# Patient Record
Sex: MALE | Race: ASIAN | NOT HISPANIC OR LATINO | URBAN - METROPOLITAN AREA
[De-identification: names, ages, dates, MRNs, and addresses within clinical notes are randomized per-mention and may not be internally consistent; named-entity substitution may affect disease eponyms.]

---

## 2017-06-14 ENCOUNTER — EMERGENCY (EMERGENCY)
Facility: HOSPITAL | Age: 57
LOS: 1 days | Discharge: HOME | End: 2017-06-14
Attending: EMERGENCY MEDICINE | Admitting: EMERGENCY MEDICINE
Payer: COMMERCIAL

## 2017-06-14 VITALS
DIASTOLIC BLOOD PRESSURE: 92 MMHG | HEART RATE: 61 BPM | OXYGEN SATURATION: 98 % | TEMPERATURE: 98 F | RESPIRATION RATE: 16 BRPM | SYSTOLIC BLOOD PRESSURE: 141 MMHG

## 2017-06-14 VITALS
DIASTOLIC BLOOD PRESSURE: 96 MMHG | WEIGHT: 143.3 LBS | HEIGHT: 63 IN | TEMPERATURE: 98 F | SYSTOLIC BLOOD PRESSURE: 143 MMHG | RESPIRATION RATE: 16 BRPM | HEART RATE: 60 BPM | OXYGEN SATURATION: 97 %

## 2017-06-14 LAB
APPEARANCE UR: CLEAR — SIGNIFICANT CHANGE UP
BILIRUB UR-MCNC: NEGATIVE — SIGNIFICANT CHANGE UP
COLOR SPEC: YELLOW — SIGNIFICANT CHANGE UP
DIFF PNL FLD: NEGATIVE — SIGNIFICANT CHANGE UP
GLUCOSE UR QL: NEGATIVE MG/DL — SIGNIFICANT CHANGE UP
KETONES UR-MCNC: NEGATIVE — SIGNIFICANT CHANGE UP
LEUKOCYTE ESTERASE UR-ACNC: NEGATIVE — SIGNIFICANT CHANGE UP
NITRITE UR-MCNC: NEGATIVE — SIGNIFICANT CHANGE UP
PH UR: 7 — SIGNIFICANT CHANGE UP (ref 5–8)
PROT UR-MCNC: NEGATIVE MG/DL — SIGNIFICANT CHANGE UP
SP GR SPEC: 1.01 — SIGNIFICANT CHANGE UP (ref 1.01–1.02)
UROBILINOGEN FLD QL: NEGATIVE MG/DL — SIGNIFICANT CHANGE UP

## 2017-06-14 PROCEDURE — 81003 URINALYSIS AUTO W/O SCOPE: CPT

## 2017-06-14 PROCEDURE — 99284 EMERGENCY DEPT VISIT MOD MDM: CPT | Mod: 25

## 2017-06-14 PROCEDURE — 74176 CT ABD & PELVIS W/O CONTRAST: CPT | Mod: 26

## 2017-06-14 PROCEDURE — 74176 CT ABD & PELVIS W/O CONTRAST: CPT

## 2017-06-14 PROCEDURE — 99284 EMERGENCY DEPT VISIT MOD MDM: CPT

## 2017-06-14 NOTE — ED PROVIDER NOTE - CHPI ED SYMPTOMS NEG
no burning urination/no nausea/no diarrhea/no abdominal distension/no hematuria/no vomiting/no blood in stool/no chills/no dysuria/no fever

## 2017-06-14 NOTE — ED PROVIDER NOTE - OBJECTIVE STATEMENT
Came in complaining of intermittent left upper quadrant pain state been going on for the past 3 weeks state 2-3 years ago had sonogram and it shows some kind of cyst the same area. Patient denies n/v/d denies any other medical problem.

## 2017-06-14 NOTE — ED ADULT NURSE NOTE - OBJECTIVE STATEMENT
Patient wlaked into ER c/o pain to left side of abdomen going on for 3 weeks now.  Pain is intermittent.

## 2017-12-06 ENCOUNTER — OUTPATIENT (OUTPATIENT)
Dept: OUTPATIENT SERVICES | Facility: HOSPITAL | Age: 57
LOS: 1 days | End: 2017-12-06
Payer: COMMERCIAL

## 2017-12-06 VITALS
WEIGHT: 145.95 LBS | TEMPERATURE: 98 F | DIASTOLIC BLOOD PRESSURE: 84 MMHG | RESPIRATION RATE: 14 BRPM | HEART RATE: 52 BPM | HEIGHT: 63 IN | SYSTOLIC BLOOD PRESSURE: 132 MMHG

## 2017-12-06 DIAGNOSIS — Z98.890 OTHER SPECIFIED POSTPROCEDURAL STATES: Chronic | ICD-10-CM

## 2017-12-06 DIAGNOSIS — L72.3 SEBACEOUS CYST: ICD-10-CM

## 2017-12-06 DIAGNOSIS — Z01.812 ENCOUNTER FOR PREPROCEDURAL LABORATORY EXAMINATION: ICD-10-CM

## 2017-12-06 LAB
ALBUMIN SERPL ELPH-MCNC: 4.1 G/DL — SIGNIFICANT CHANGE UP (ref 3.3–5)
ALP SERPL-CCNC: 41 U/L — SIGNIFICANT CHANGE UP (ref 40–120)
ALT FLD-CCNC: 30 U/L — SIGNIFICANT CHANGE UP (ref 12–78)
ANION GAP SERPL CALC-SCNC: 5 MMOL/L — SIGNIFICANT CHANGE UP (ref 5–17)
APPEARANCE UR: CLEAR — SIGNIFICANT CHANGE UP
AST SERPL-CCNC: 19 U/L — SIGNIFICANT CHANGE UP (ref 15–37)
BILIRUB SERPL-MCNC: 0.8 MG/DL — SIGNIFICANT CHANGE UP (ref 0.2–1.2)
BILIRUB UR-MCNC: NEGATIVE — SIGNIFICANT CHANGE UP
BUN SERPL-MCNC: 11 MG/DL — SIGNIFICANT CHANGE UP (ref 7–23)
CALCIUM SERPL-MCNC: 8.8 MG/DL — SIGNIFICANT CHANGE UP (ref 8.5–10.1)
CHLORIDE SERPL-SCNC: 102 MMOL/L — SIGNIFICANT CHANGE UP (ref 96–108)
CO2 SERPL-SCNC: 29 MMOL/L — SIGNIFICANT CHANGE UP (ref 22–31)
COLOR SPEC: YELLOW — SIGNIFICANT CHANGE UP
CREAT SERPL-MCNC: 1.1 MG/DL — SIGNIFICANT CHANGE UP (ref 0.5–1.3)
DIFF PNL FLD: NEGATIVE — SIGNIFICANT CHANGE UP
GLUCOSE SERPL-MCNC: 135 MG/DL — HIGH (ref 70–99)
GLUCOSE UR QL: NEGATIVE — SIGNIFICANT CHANGE UP
HBA1C BLD-MCNC: 6.3 % — HIGH (ref 4–5.6)
HCT VFR BLD CALC: 46.2 % — SIGNIFICANT CHANGE UP (ref 39–50)
HGB BLD-MCNC: 15.1 G/DL — SIGNIFICANT CHANGE UP (ref 13–17)
KETONES UR-MCNC: NEGATIVE — SIGNIFICANT CHANGE UP
LEUKOCYTE ESTERASE UR-ACNC: NEGATIVE — SIGNIFICANT CHANGE UP
MCHC RBC-ENTMCNC: 31.7 PG — SIGNIFICANT CHANGE UP (ref 27–34)
MCHC RBC-ENTMCNC: 32.7 GM/DL — SIGNIFICANT CHANGE UP (ref 32–36)
MCV RBC AUTO: 97 FL — SIGNIFICANT CHANGE UP (ref 80–100)
NITRITE UR-MCNC: NEGATIVE — SIGNIFICANT CHANGE UP
PH UR: 7 — SIGNIFICANT CHANGE UP (ref 5–8)
PLATELET # BLD AUTO: 257 K/UL — SIGNIFICANT CHANGE UP (ref 150–400)
POTASSIUM SERPL-MCNC: 4.2 MMOL/L — SIGNIFICANT CHANGE UP (ref 3.5–5.3)
POTASSIUM SERPL-SCNC: 4.2 MMOL/L — SIGNIFICANT CHANGE UP (ref 3.5–5.3)
PROT SERPL-MCNC: 7.6 G/DL — SIGNIFICANT CHANGE UP (ref 6–8.3)
PROT UR-MCNC: NEGATIVE — SIGNIFICANT CHANGE UP
RBC # BLD: 4.77 M/UL — SIGNIFICANT CHANGE UP (ref 4.2–5.8)
RBC # FLD: 11.6 % — SIGNIFICANT CHANGE UP (ref 10.3–14.5)
SODIUM SERPL-SCNC: 136 MMOL/L — SIGNIFICANT CHANGE UP (ref 135–145)
SP GR SPEC: 1 — LOW (ref 1.01–1.02)
UROBILINOGEN FLD QL: NEGATIVE — SIGNIFICANT CHANGE UP
WBC # BLD: 5.1 K/UL — SIGNIFICANT CHANGE UP (ref 3.8–10.5)
WBC # FLD AUTO: 5.1 K/UL — SIGNIFICANT CHANGE UP (ref 3.8–10.5)

## 2017-12-06 PROCEDURE — 93010 ELECTROCARDIOGRAM REPORT: CPT

## 2017-12-06 PROCEDURE — 87086 URINE CULTURE/COLONY COUNT: CPT

## 2017-12-06 PROCEDURE — 83036 HEMOGLOBIN GLYCOSYLATED A1C: CPT

## 2017-12-06 PROCEDURE — 85027 COMPLETE CBC AUTOMATED: CPT

## 2017-12-06 PROCEDURE — 80053 COMPREHEN METABOLIC PANEL: CPT

## 2017-12-06 PROCEDURE — G0463: CPT

## 2017-12-06 PROCEDURE — 81003 URINALYSIS AUTO W/O SCOPE: CPT

## 2017-12-06 PROCEDURE — 93005 ELECTROCARDIOGRAM TRACING: CPT

## 2017-12-06 NOTE — H&P PST ADULT - PROBLEM SELECTOR PLAN 1
PST Labs; CBC< CMP, HgB A1C, U/A, Urine Culture, EKG - medical Clearance with Dr Badillo - pt was seen will fax PST results to PCP for review - pre-op instructions given to pt  and wife Alondra with understanding verbalized

## 2017-12-06 NOTE — H&P PST ADULT - ASSESSMENT
57 year old male with Sebaceous Cyst -Scheduled for Excision of Scrotal Mass with Dr Chip Madden on 12/15/17 -m

## 2017-12-06 NOTE — H&P PST ADULT - VISION (WITH CORRECTIVE LENSES IF THE PATIENT USUALLY WEARS THEM):
Wears Reading Glasses/Normal vision: sees adequately in most situations; can see medication labels, newsprint

## 2017-12-06 NOTE — H&P PST ADULT - FAMILY HISTORY
Father  Still living? No  Family history of stroke, Age at diagnosis: Age Unknown     Mother  Still living? No  Family history of stroke, Age at diagnosis: Age Unknown

## 2017-12-06 NOTE — H&P PST ADULT - PMH
Anxiety    Bradycardia    Hypercholesterolemia    PRASAD (obstructive sleep apnea)  On CPAP  Prediabetes    Sebaceous cyst

## 2017-12-07 LAB
CULTURE RESULTS: NO GROWTH — SIGNIFICANT CHANGE UP
SPECIMEN SOURCE: SIGNIFICANT CHANGE UP

## 2017-12-14 RX ORDER — SODIUM CHLORIDE 9 MG/ML
1000 INJECTION, SOLUTION INTRAVENOUS
Qty: 0 | Refills: 0 | Status: DISCONTINUED | OUTPATIENT
Start: 2017-12-15 | End: 2017-12-30

## 2017-12-15 ENCOUNTER — OUTPATIENT (OUTPATIENT)
Dept: OUTPATIENT SERVICES | Facility: HOSPITAL | Age: 57
LOS: 1 days | End: 2017-12-15
Payer: COMMERCIAL

## 2017-12-15 ENCOUNTER — TRANSCRIPTION ENCOUNTER (OUTPATIENT)
Age: 57
End: 2017-12-15

## 2017-12-15 ENCOUNTER — RESULT REVIEW (OUTPATIENT)
Age: 57
End: 2017-12-15

## 2017-12-15 VITALS
SYSTOLIC BLOOD PRESSURE: 121 MMHG | HEIGHT: 63 IN | TEMPERATURE: 98 F | DIASTOLIC BLOOD PRESSURE: 76 MMHG | HEART RATE: 55 BPM | RESPIRATION RATE: 14 BRPM | WEIGHT: 145.95 LBS | OXYGEN SATURATION: 97 %

## 2017-12-15 VITALS
OXYGEN SATURATION: 98 % | HEART RATE: 68 BPM | RESPIRATION RATE: 16 BRPM | TEMPERATURE: 98 F | SYSTOLIC BLOOD PRESSURE: 105 MMHG | DIASTOLIC BLOOD PRESSURE: 75 MMHG

## 2017-12-15 DIAGNOSIS — Z98.890 OTHER SPECIFIED POSTPROCEDURAL STATES: Chronic | ICD-10-CM

## 2017-12-15 DIAGNOSIS — L72.3 SEBACEOUS CYST: ICD-10-CM

## 2017-12-15 PROCEDURE — 88304 TISSUE EXAM BY PATHOLOGIST: CPT | Mod: 26

## 2017-12-15 PROCEDURE — 88304 TISSUE EXAM BY PATHOLOGIST: CPT

## 2017-12-15 PROCEDURE — 11426 EXC H-F-NK-SP B9+MARG >4 CM: CPT

## 2017-12-15 RX ORDER — HYDROMORPHONE HYDROCHLORIDE 2 MG/ML
0.5 INJECTION INTRAMUSCULAR; INTRAVENOUS; SUBCUTANEOUS
Qty: 0 | Refills: 0 | Status: DISCONTINUED | OUTPATIENT
Start: 2017-12-15 | End: 2017-12-15

## 2017-12-15 RX ORDER — OXYCODONE HYDROCHLORIDE 5 MG/1
5 TABLET ORAL EVERY 4 HOURS
Qty: 0 | Refills: 0 | Status: DISCONTINUED | OUTPATIENT
Start: 2017-12-15 | End: 2017-12-15

## 2017-12-15 RX ORDER — SODIUM CHLORIDE 9 MG/ML
1000 INJECTION, SOLUTION INTRAVENOUS
Qty: 0 | Refills: 0 | Status: DISCONTINUED | OUTPATIENT
Start: 2017-12-15 | End: 2017-12-15

## 2017-12-15 RX ORDER — ONDANSETRON 8 MG/1
4 TABLET, FILM COATED ORAL ONCE
Qty: 0 | Refills: 0 | Status: DISCONTINUED | OUTPATIENT
Start: 2017-12-15 | End: 2017-12-15

## 2017-12-15 RX ORDER — CEFAZOLIN SODIUM 1 G
2000 VIAL (EA) INJECTION ONCE
Qty: 0 | Refills: 0 | Status: COMPLETED | OUTPATIENT
Start: 2017-12-15 | End: 2017-12-15

## 2017-12-15 RX ORDER — CEPHALEXIN 500 MG
1 CAPSULE ORAL
Qty: 15 | Refills: 0 | OUTPATIENT
Start: 2017-12-15 | End: 2017-12-19

## 2017-12-15 RX ADMIN — SODIUM CHLORIDE 100 MILLILITER(S): 9 INJECTION, SOLUTION INTRAVENOUS at 10:37

## 2017-12-15 RX ADMIN — SODIUM CHLORIDE 75 MILLILITER(S): 9 INJECTION, SOLUTION INTRAVENOUS at 09:25

## 2017-12-15 NOTE — BRIEF OPERATIVE NOTE - PROCEDURE
<<-----Click on this checkbox to enter Procedure Excision of cyst of scrotum  12/15/2017  sebaceous  Active  RLUNTZ

## 2017-12-15 NOTE — ASU PATIENT PROFILE, ADULT - VISION (WITH CORRECTIVE LENSES IF THE PATIENT USUALLY WEARS THEM):
Normal vision: sees adequately in most situations; can see medication labels, newsprint/Wears Reading Glasses

## 2017-12-15 NOTE — ASU DISCHARGE PLAN (ADULT/PEDIATRIC). - MEDICATION SUMMARY - MEDICATIONS TO TAKE
I will START or STAY ON the medications listed below when I get home from the hospital:    Percocet 5/325 oral tablet  -- 1 tab(s) by mouth every 6 hours as needed for pain MDD:6  -- Caution federal law prohibits the transfer of this drug to any person other  than the person for whom it was prescribed.  May cause drowsiness.  Alcohol may intensify this effect.  Use care when operating dangerous machinery.  This prescription cannot be refilled.  This product contains acetaminophen.  Do not use  with any other product containing acetaminophen to prevent possible liver damage.  Using more of this medication than prescribed may cause serious breathing problems.    -- Indication: For as needed for pain    cephalexin 500 mg oral capsule  -- 1 cap(s) by mouth 3 times a day   -- Finish all this medication unless otherwise directed by prescriber.    -- Indication: For antibiotic    Co Q-10 100 mg oral capsule  -- 1 cap(s) by mouth once a day  -- Indication: For as prior to admission    Omega-3 oral capsule  -- 1 cap(s) by mouth once a day  -- Indication: For as prior to admission

## 2017-12-18 LAB — SURGICAL PATHOLOGY FINAL REPORT - CH: SIGNIFICANT CHANGE UP

## 2018-06-21 ENCOUNTER — EMERGENCY (EMERGENCY)
Facility: HOSPITAL | Age: 58
LOS: 1 days | Discharge: HOME | End: 2018-06-21
Attending: EMERGENCY MEDICINE
Payer: COMMERCIAL

## 2018-06-21 VITALS
WEIGHT: 145.51 LBS | HEART RATE: 56 BPM | SYSTOLIC BLOOD PRESSURE: 134 MMHG | RESPIRATION RATE: 19 BRPM | OXYGEN SATURATION: 99 % | DIASTOLIC BLOOD PRESSURE: 87 MMHG | TEMPERATURE: 98 F

## 2018-06-21 DIAGNOSIS — Z98.890 OTHER SPECIFIED POSTPROCEDURAL STATES: Chronic | ICD-10-CM

## 2018-06-21 LAB
BASOPHILS # BLD AUTO: 0.04 K/UL — SIGNIFICANT CHANGE UP (ref 0–0.2)
BASOPHILS NFR BLD AUTO: 0.5 % — SIGNIFICANT CHANGE UP (ref 0–2)
EOSINOPHIL # BLD AUTO: 0.17 K/UL — SIGNIFICANT CHANGE UP (ref 0–0.5)
EOSINOPHIL NFR BLD AUTO: 2 % — SIGNIFICANT CHANGE UP (ref 0–6)
HCT VFR BLD CALC: 39.6 % — SIGNIFICANT CHANGE UP (ref 39–50)
HGB BLD-MCNC: 13.7 G/DL — SIGNIFICANT CHANGE UP (ref 13–17)
IMM GRANULOCYTES NFR BLD AUTO: 0.2 % — SIGNIFICANT CHANGE UP (ref 0–1.5)
LYMPHOCYTES # BLD AUTO: 2.18 K/UL — SIGNIFICANT CHANGE UP (ref 1–3.3)
LYMPHOCYTES # BLD AUTO: 25 % — SIGNIFICANT CHANGE UP (ref 13–44)
MCHC RBC-ENTMCNC: 32.3 PG — SIGNIFICANT CHANGE UP (ref 27–34)
MCHC RBC-ENTMCNC: 34.6 GM/DL — SIGNIFICANT CHANGE UP (ref 32–36)
MCV RBC AUTO: 93.4 FL — SIGNIFICANT CHANGE UP (ref 80–100)
MONOCYTES # BLD AUTO: 0.95 K/UL — HIGH (ref 0–0.9)
MONOCYTES NFR BLD AUTO: 10.9 % — SIGNIFICANT CHANGE UP (ref 2–14)
NEUTROPHILS # BLD AUTO: 5.35 K/UL — SIGNIFICANT CHANGE UP (ref 1.8–7.4)
NEUTROPHILS NFR BLD AUTO: 61.4 % — SIGNIFICANT CHANGE UP (ref 43–77)
PLATELET # BLD AUTO: 249 K/UL — SIGNIFICANT CHANGE UP (ref 150–400)
RBC # BLD: 4.24 M/UL — SIGNIFICANT CHANGE UP (ref 4.2–5.8)
RBC # FLD: 12.6 % — SIGNIFICANT CHANGE UP (ref 10.3–14.5)
URATE SERPL-MCNC: 5.4 MG/DL — SIGNIFICANT CHANGE UP (ref 3.4–8.8)
WBC # BLD: 8.71 K/UL — SIGNIFICANT CHANGE UP (ref 3.8–10.5)
WBC # FLD AUTO: 8.71 K/UL — SIGNIFICANT CHANGE UP (ref 3.8–10.5)

## 2018-06-21 PROCEDURE — 73630 X-RAY EXAM OF FOOT: CPT | Mod: 26,RT

## 2018-06-21 PROCEDURE — 73630 X-RAY EXAM OF FOOT: CPT

## 2018-06-21 PROCEDURE — 85652 RBC SED RATE AUTOMATED: CPT

## 2018-06-21 PROCEDURE — 85027 COMPLETE CBC AUTOMATED: CPT

## 2018-06-21 PROCEDURE — 99283 EMERGENCY DEPT VISIT LOW MDM: CPT | Mod: 25

## 2018-06-21 PROCEDURE — 99283 EMERGENCY DEPT VISIT LOW MDM: CPT

## 2018-06-21 PROCEDURE — 84550 ASSAY OF BLOOD/URIC ACID: CPT

## 2018-06-21 RX ORDER — IBUPROFEN 200 MG
600 TABLET ORAL ONCE
Qty: 0 | Refills: 0 | Status: COMPLETED | OUTPATIENT
Start: 2018-06-21 | End: 2018-06-21

## 2018-06-21 RX ORDER — INDOMETHACIN 50 MG
50 CAPSULE ORAL ONCE
Qty: 0 | Refills: 0 | Status: DISCONTINUED | OUTPATIENT
Start: 2018-06-21 | End: 2018-06-21

## 2018-06-21 RX ORDER — UBIDECARENONE 100 MG
1 CAPSULE ORAL
Qty: 0 | Refills: 0 | COMMUNITY

## 2018-06-21 RX ORDER — OMEGA-3 ACID ETHYL ESTERS 1 G
1 CAPSULE ORAL
Qty: 0 | Refills: 0 | COMMUNITY

## 2018-06-21 RX ADMIN — Medication 600 MILLIGRAM(S): at 20:42

## 2018-06-21 NOTE — ED PROVIDER NOTE - PROGRESS NOTE DETAILS
patient resting comfortably,  refused to wait till complete blood work results,  refused rx medication, states will follow up with pmd , copy of available blood work results given, advised if any concerns return to ed

## 2018-06-21 NOTE — ED ADULT NURSE NOTE - OBJECTIVE STATEMENT
Present to ER with c/o of right big toe pain. Pt states he does not recall any trauma of injury. Denies any numbness or weakness

## 2018-06-21 NOTE — ED PROVIDER NOTE - OBJECTIVE STATEMENT
58 y male presents with right 1st metatarsal pain x 3 days, does not recall specific injury,  no fever, nonsmoker, no etoh.  PMD Dr Langley  pmh: dm, htn  PMH: Asthma, mild intermittent, uncomplicated  Blood disorder  Factor Eleven Deficiency  Gastroesophageal reflux disease, esophagitis presence not specified  Hyperlipidemia  Hypertension  Hypothyroidism  Obesity  PRASAD on CPAP    Peripheral neuropathy  Seasonal allergies  Sleep apnea

## 2018-06-21 NOTE — ED PROVIDER NOTE - ATTENDING CONTRIBUTION TO CARE
I have personally performed a face to face diagnostic evaluation on this patient.  I have reviewed the PA note and agree with the history, exam, and plan of care, except as noted.  History and Exam by me shows 58 male c/o right great toe pain for 3 days, denies trauma, no fever, patient in no acute distress, heart and lung sounds clear, abdomen soft, right great toe shows mild swelling with some blanching erythema, cap refill < 2 seconds, no open wounds, to get NSAID, xray, f/u as outpatient for further work up.

## 2018-06-22 LAB — ERYTHROCYTE [SEDIMENTATION RATE] IN BLOOD: 13 MM/HR — SIGNIFICANT CHANGE UP (ref 0–20)

## 2019-07-19 NOTE — H&P PST ADULT - HISTORY OF PRESENT ILLNESS
Family Medicine 57 year old male h/o Hypercholesterolemia, Sleep Apnea - scheduled for  Excision Scrotal Mass with Dr Chip Madden on 12/15/17 - pt notes he has had it for 7 years already - "started as a small black pimple" - pt notes he has been being monitored for few years by Dr Madden - pt notes he has had Sonogram and MRI and its "Encapsulated" - pt notes it has been getting bigger- denies any urinary symptoms - following discussions with Dr Madden pt is electing for scheduled procedure.

## 2019-11-21 ENCOUNTER — APPOINTMENT (OUTPATIENT)
Dept: OTOLARYNGOLOGY | Facility: CLINIC | Age: 59
End: 2019-11-21

## 2021-03-26 NOTE — ED ADULT TRIAGE NOTE - AS O2 DELIVERY
Tracking was updated.  No lab order necessary. Anticipated INR recheck date via [employer]: 4/23/21.  INR results will be called/faxed in.  INR: 3.0 on 3/26/21  Last office visit with Dr. Padilla on 4/29/20       room air

## 2021-11-22 NOTE — ED PROVIDER NOTE - NS HIV RISK FACTOR YES
You can access the FollowMyHealth Patient Portal offered by [facility] by registering at the following website: [email or web address]. By joining [employer]’s FollowMyHealth portal, you will also be able to view your health information using other applications (apps) compatible with our system.
Declined

## 2022-02-24 PROBLEM — G47.33 OBSTRUCTIVE SLEEP APNEA (ADULT) (PEDIATRIC): Chronic | Status: ACTIVE | Noted: 2017-12-06

## 2022-02-24 PROBLEM — E78.00 PURE HYPERCHOLESTEROLEMIA, UNSPECIFIED: Chronic | Status: ACTIVE | Noted: 2017-12-06

## 2022-02-24 PROBLEM — R73.03 PREDIABETES: Chronic | Status: ACTIVE | Noted: 2017-12-06

## 2022-02-24 PROBLEM — R00.1 BRADYCARDIA, UNSPECIFIED: Chronic | Status: ACTIVE | Noted: 2017-12-06

## 2022-02-24 PROBLEM — L72.3 SEBACEOUS CYST: Chronic | Status: ACTIVE | Noted: 2017-12-06

## 2022-02-24 PROBLEM — F41.9 ANXIETY DISORDER, UNSPECIFIED: Chronic | Status: ACTIVE | Noted: 2017-12-06

## 2022-02-25 ENCOUNTER — EMERGENCY (EMERGENCY)
Facility: HOSPITAL | Age: 62
LOS: 1 days | Discharge: HOME | End: 2022-02-25
Attending: EMERGENCY MEDICINE | Admitting: EMERGENCY MEDICINE
Payer: COMMERCIAL

## 2022-02-25 VITALS
TEMPERATURE: 98 F | HEART RATE: 65 BPM | RESPIRATION RATE: 18 BRPM | SYSTOLIC BLOOD PRESSURE: 150 MMHG | DIASTOLIC BLOOD PRESSURE: 93 MMHG | OXYGEN SATURATION: 97 %

## 2022-02-25 VITALS
OXYGEN SATURATION: 98 % | WEIGHT: 149.91 LBS | SYSTOLIC BLOOD PRESSURE: 165 MMHG | DIASTOLIC BLOOD PRESSURE: 91 MMHG | RESPIRATION RATE: 18 BRPM | TEMPERATURE: 98 F | HEIGHT: 63 IN | HEART RATE: 56 BPM

## 2022-02-25 DIAGNOSIS — Z98.890 OTHER SPECIFIED POSTPROCEDURAL STATES: Chronic | ICD-10-CM

## 2022-02-25 DIAGNOSIS — R07.9 CHEST PAIN, UNSPECIFIED: ICD-10-CM

## 2022-02-25 LAB
ALBUMIN SERPL ELPH-MCNC: 3.9 G/DL — SIGNIFICANT CHANGE UP (ref 3.3–5)
ALP SERPL-CCNC: 37 U/L — LOW (ref 40–120)
ALT FLD-CCNC: 39 U/L — SIGNIFICANT CHANGE UP (ref 12–78)
ANION GAP SERPL CALC-SCNC: 6 MMOL/L — SIGNIFICANT CHANGE UP (ref 5–17)
AST SERPL-CCNC: 25 U/L — SIGNIFICANT CHANGE UP (ref 15–37)
BASOPHILS # BLD AUTO: 0.05 K/UL — SIGNIFICANT CHANGE UP (ref 0–0.2)
BASOPHILS NFR BLD AUTO: 0.8 % — SIGNIFICANT CHANGE UP (ref 0–2)
BILIRUB SERPL-MCNC: 0.9 MG/DL — SIGNIFICANT CHANGE UP (ref 0.2–1.2)
BUN SERPL-MCNC: 8 MG/DL — SIGNIFICANT CHANGE UP (ref 7–23)
CALCIUM SERPL-MCNC: 9.1 MG/DL — SIGNIFICANT CHANGE UP (ref 8.5–10.1)
CHLORIDE SERPL-SCNC: 104 MMOL/L — SIGNIFICANT CHANGE UP (ref 96–108)
CK SERPL-CCNC: 225 U/L — SIGNIFICANT CHANGE UP (ref 26–308)
CO2 SERPL-SCNC: 26 MMOL/L — SIGNIFICANT CHANGE UP (ref 22–31)
CREAT SERPL-MCNC: 1.1 MG/DL — SIGNIFICANT CHANGE UP (ref 0.5–1.3)
D DIMER BLD IA.RAPID-MCNC: <150 NG/ML DDU — SIGNIFICANT CHANGE UP
EOSINOPHIL # BLD AUTO: 0.16 K/UL — SIGNIFICANT CHANGE UP (ref 0–0.5)
EOSINOPHIL NFR BLD AUTO: 2.4 % — SIGNIFICANT CHANGE UP (ref 0–6)
GLUCOSE SERPL-MCNC: 121 MG/DL — HIGH (ref 70–99)
HCT VFR BLD CALC: 39.6 % — SIGNIFICANT CHANGE UP (ref 39–50)
HGB BLD-MCNC: 13.9 G/DL — SIGNIFICANT CHANGE UP (ref 13–17)
IMM GRANULOCYTES NFR BLD AUTO: 0.2 % — SIGNIFICANT CHANGE UP (ref 0–1.5)
LIDOCAIN IGE QN: 143 U/L — SIGNIFICANT CHANGE UP (ref 73–393)
LYMPHOCYTES # BLD AUTO: 2.09 K/UL — SIGNIFICANT CHANGE UP (ref 1–3.3)
LYMPHOCYTES # BLD AUTO: 31.4 % — SIGNIFICANT CHANGE UP (ref 13–44)
MAGNESIUM SERPL-MCNC: 2.1 MG/DL — SIGNIFICANT CHANGE UP (ref 1.6–2.6)
MCHC RBC-ENTMCNC: 32.2 PG — SIGNIFICANT CHANGE UP (ref 27–34)
MCHC RBC-ENTMCNC: 35.1 GM/DL — SIGNIFICANT CHANGE UP (ref 32–36)
MCV RBC AUTO: 91.7 FL — SIGNIFICANT CHANGE UP (ref 80–100)
MONOCYTES # BLD AUTO: 0.61 K/UL — SIGNIFICANT CHANGE UP (ref 0–0.9)
MONOCYTES NFR BLD AUTO: 9.2 % — SIGNIFICANT CHANGE UP (ref 2–14)
NEUTROPHILS # BLD AUTO: 3.73 K/UL — SIGNIFICANT CHANGE UP (ref 1.8–7.4)
NEUTROPHILS NFR BLD AUTO: 56 % — SIGNIFICANT CHANGE UP (ref 43–77)
NRBC # BLD: 0 /100 WBCS — SIGNIFICANT CHANGE UP (ref 0–0)
PHOSPHATE SERPL-MCNC: 3 MG/DL — SIGNIFICANT CHANGE UP (ref 2.5–4.5)
PLATELET # BLD AUTO: 252 K/UL — SIGNIFICANT CHANGE UP (ref 150–400)
POTASSIUM SERPL-MCNC: 4 MMOL/L — SIGNIFICANT CHANGE UP (ref 3.5–5.3)
POTASSIUM SERPL-SCNC: 4 MMOL/L — SIGNIFICANT CHANGE UP (ref 3.5–5.3)
PROT SERPL-MCNC: 7.1 G/DL — SIGNIFICANT CHANGE UP (ref 6–8.3)
RBC # BLD: 4.32 M/UL — SIGNIFICANT CHANGE UP (ref 4.2–5.8)
RBC # FLD: 12.5 % — SIGNIFICANT CHANGE UP (ref 10.3–14.5)
SODIUM SERPL-SCNC: 136 MMOL/L — SIGNIFICANT CHANGE UP (ref 135–145)
TROPONIN I, HIGH SENSITIVITY RESULT: 10.6 NG/L — SIGNIFICANT CHANGE UP
TROPONIN I, HIGH SENSITIVITY RESULT: 13.3 NG/L — SIGNIFICANT CHANGE UP
WBC # BLD: 6.65 K/UL — SIGNIFICANT CHANGE UP (ref 3.8–10.5)
WBC # FLD AUTO: 6.65 K/UL — SIGNIFICANT CHANGE UP (ref 3.8–10.5)

## 2022-02-25 PROCEDURE — 85025 COMPLETE CBC W/AUTO DIFF WBC: CPT

## 2022-02-25 PROCEDURE — 71045 X-RAY EXAM CHEST 1 VIEW: CPT | Mod: 26

## 2022-02-25 PROCEDURE — 84100 ASSAY OF PHOSPHORUS: CPT

## 2022-02-25 PROCEDURE — 82550 ASSAY OF CK (CPK): CPT

## 2022-02-25 PROCEDURE — 99285 EMERGENCY DEPT VISIT HI MDM: CPT

## 2022-02-25 PROCEDURE — 99284 EMERGENCY DEPT VISIT MOD MDM: CPT

## 2022-02-25 PROCEDURE — 72040 X-RAY EXAM NECK SPINE 2-3 VW: CPT

## 2022-02-25 PROCEDURE — 83690 ASSAY OF LIPASE: CPT

## 2022-02-25 PROCEDURE — 72040 X-RAY EXAM NECK SPINE 2-3 VW: CPT | Mod: 26

## 2022-02-25 PROCEDURE — 71045 X-RAY EXAM CHEST 1 VIEW: CPT

## 2022-02-25 PROCEDURE — 99285 EMERGENCY DEPT VISIT HI MDM: CPT | Mod: 25

## 2022-02-25 PROCEDURE — 93010 ELECTROCARDIOGRAM REPORT: CPT

## 2022-02-25 PROCEDURE — 84484 ASSAY OF TROPONIN QUANT: CPT

## 2022-02-25 PROCEDURE — 83735 ASSAY OF MAGNESIUM: CPT

## 2022-02-25 PROCEDURE — 93005 ELECTROCARDIOGRAM TRACING: CPT

## 2022-02-25 PROCEDURE — 36415 COLL VENOUS BLD VENIPUNCTURE: CPT

## 2022-02-25 PROCEDURE — 85379 FIBRIN DEGRADATION QUANT: CPT

## 2022-02-25 PROCEDURE — 80053 COMPREHEN METABOLIC PANEL: CPT

## 2022-02-25 RX ORDER — ATORVASTATIN CALCIUM 80 MG/1
1 TABLET, FILM COATED ORAL
Qty: 0 | Refills: 0 | DISCHARGE

## 2022-02-25 RX ORDER — METFORMIN HYDROCHLORIDE 850 MG/1
1 TABLET ORAL
Qty: 0 | Refills: 0 | DISCHARGE

## 2022-02-25 RX ORDER — CYCLOBENZAPRINE HYDROCHLORIDE 10 MG/1
10 TABLET, FILM COATED ORAL ONCE
Refills: 0 | Status: COMPLETED | OUTPATIENT
Start: 2022-02-25 | End: 2022-02-25

## 2022-02-25 NOTE — ED PROVIDER NOTE - ENMT, MLM
Patient/Caregiver provided printed discharge information.
Airway patent, Nasal mucosa clear. Mouth with normal mucosa.

## 2022-02-25 NOTE — ED PROVIDER NOTE - CLINICAL SUMMARY MEDICAL DECISION MAKING FREE TEXT BOX
Pt is a 62 yo male who presents to the ED with a cc of left arm tingling. PMHx of anxiety, h/o bradycardia, HLD, PRASAD, pre-DM, h/o sebaceous cyst. Pt reports that last Thursday he received his Pfizer booster in his right arm. He reports that on Sunday he noted some tingling to his left arm. He reports that last night he also began to feel warm. Pt denies fever. Pt reports that the tingling to his left arm is intermittent. Pt denies tingling at this time but does note some discomfort to his left arm. Denies any injury to the arm, denies any heavy lifting. Pt reports that when he is experiencing the pain or tingling the symptoms radiate from his shoulder into his wrist but not hand. Denies any provoking or relieving symptoms. Pt also notes 4 days of nausea and some intermittent chest tightness. Denies V/D/C, CP, SOB, abd pain. Denies HA, visual changes. Denies neck pain. Pt took ASA at 3 am. Of note wife reports that pt had clean heart cath 2014. Pt is a 62 yo male who presents to the ED with a cc of left arm tingling. PMHx of anxiety, h/o bradycardia, HLD, PRASAD, DM, h/o sebaceous cyst. Pt reports that last Thursday he received his Pfizer booster in his right arm. He reports that on Sunday he noted some tingling to his left arm. He reports that last night he also began to feel warm. Pt denies fever. Pt reports that the tingling to his left arm is intermittent. Pt denies tingling at this time but does note some discomfort to his left arm. Denies any injury to the arm, denies any heavy lifting. Pt reports that when he is experiencing the pain or tingling the symptoms radiate from his shoulder into his wrist but not hand. Denies any provoking or relieving symptoms. Pt also notes 4 days of nausea and some intermittent chest tightness. Denies V/D/C, CP, SOB, abd pain. Denies HA, visual changes. Denies neck pain. Pt took ASA at 3 am. Of note wife reports that pt had clean heart cath 2014. Pt presenting with atypical chest pain and left arm intermittent paresthesias. For chest pain will obtain screening labs, EKG ad consult with cardiology. For arm paresthesia will obtain imaging of neck and will medicate

## 2022-02-25 NOTE — ED ADULT NURSE NOTE - OBJECTIVE STATEMENT
received patient in stretcher, alert and oriented, No signs of acute distress.  Pt reports receiving covid booster shot in right arm this past Thursday, since Sunday pt c/o tingling and pain down left arm, chest tightness, nausea and hot flashes. Pt denies any trauma to right arm.

## 2022-02-25 NOTE — ED PROVIDER NOTE - NSICDXFAMILYHX_GEN_ALL_CORE_FT
FAMILY HISTORY:  Father  Still living? No  Family history of stroke, Age at diagnosis: Age Unknown    Mother  Still living? No  Family history of stroke, Age at diagnosis: Age Unknown

## 2022-02-25 NOTE — ED PROVIDER NOTE - PATIENT PORTAL LINK FT
You can access the FollowMyHealth Patient Portal offered by [facility] by registering at the following website: [email or web address]. By joining [employer]’s FollowMyHealth portal, you will also be able to view your health information using other applications (apps) compatible with our system.

## 2022-02-25 NOTE — ED ADULT TRIAGE NOTE - CHIEF COMPLAINT QUOTE
Patient arrives alert and oriented x4 c/o left arm tingling and states he feels. Patient reports that he recently got booster shot last Thursday and states symptom started after, accompanied with nausea, increased tingling to left arm and last night at 12am felt hot and could not go back to sleep. Took ASA 325mg today. Patient respiration even and unlabored + RO, neuro intact, clear speech. Yaakovhukwelvin ALVAREZ

## 2022-02-25 NOTE — ED PROVIDER NOTE - PHYSICAL EXAMINATION
no midline C/T/L TTP  no TTP overlying left bicep tendon  No reproducible TTP to LUE, sensation grossly intact 5/5 strength, +radial pulse cap refill less then 2 seconds

## 2022-02-25 NOTE — ED PROVIDER NOTE - NSICDXPASTMEDICALHX_GEN_ALL_CORE_FT
PAST MEDICAL HISTORY:  Anxiety     Bradycardia     Hypercholesterolemia     PRASAD (obstructive sleep apnea) On CPAP    Prediabetes     Sebaceous cyst

## 2022-02-25 NOTE — ED PROVIDER NOTE - OBJECTIVE STATEMENT
Pt is a 60 yo male who presents to the ED with a cc of left arm tingling. PMHx of anxiety, h/o bradycardia, HLD, PRASAD, pre-DM, h/o sebaceous cyst. Pt reports that last thursday he receieved his Phizer booster in his right arm. He reports that on Sunday he noted some tingling to his left arm. He reports that last night he also began to feel warm. Pt denies fever. Pt reports that the tingling to his left arm is intermittent. Pt denies tingling at this time but does note some discomfort to his left arm. Denies any injuyr to the arm, denies any heavy lifiting. Pt erports that when he is experiencing the pain or tingling the symptoms radiate from his shoulder into his wrist but not hand. Denies any provoking or relieving symptoms. Pt also notes 4 days of nausea. Denies V/D/C, CP, SOB, abd pain. Denies HA, visual changes. Denies neck pain. Pt took ASA at 3 am.     PMD: Cahrissa  Cardio: none Pt is a 60 yo male who presents to the ED with a cc of left arm tingling. PMHx of anxiety, h/o bradycardia, HLD, PRASAD, pre-DM, h/o sebaceous cyst. Pt reports that last Thursday he received his Pfizer booster in his right arm. He reports that on Sunday he noted some tingling to his left arm. He reports that last night he also began to feel warm. Pt denies fever. Pt reports that the tingling to his left arm is intermittent. Pt denies tingling at this time but does note some discomfort to his left arm. Denies any injury to the arm, denies any heavy lifting. Pt reports that when he is experiencing the pain or tingling the symptoms radiate from his shoulder into his wrist but not hand. Denies any provoking or relieving symptoms. Pt also notes 4 days of nausea and some intermittent chest tightness. Denies V/D/C, CP, SOB, abd pain. Denies HA, visual changes. Denies neck pain. Pt took ASA at 3 am. Of note wife reports that pt had clean heart cath 2014.    PMD: Charissa  Cardio: none Pt is a 60 yo male who presents to the ED with a cc of left arm tingling. PMHx of anxiety, h/o bradycardia, HLD, PRASAD, DM, h/o sebaceous cyst. Pt reports that last Thursday he received his Pfizer booster in his right arm. He reports that on Sunday he noted some tingling to his left arm. He reports that last night he also began to feel warm. Pt denies fever. Pt reports that the tingling to his left arm is intermittent. Pt denies tingling at this time but does note some discomfort to his left arm. Denies any injury to the arm, denies any heavy lifting. Pt reports that when he is experiencing the pain or tingling the symptoms radiate from his shoulder into his wrist but not hand. Denies any provoking or relieving symptoms. Pt also notes 4 days of nausea and some intermittent chest tightness. Denies V/D/C, CP, SOB, abd pain. Denies HA, visual changes. Denies neck pain. Pt took ASA at 3 am. Of note wife reports that pt had clean heart cath 2014.    PMD: Charissa  Cardio: none

## 2022-02-25 NOTE — CONSULT NOTE ADULT - ASSESSMENT
Mr. Cross is a 61 year old male with HTN, DM2 and HLD, here with left arm pain and heaviness, and intermittent chest tightness.    - pain is atypical and no evidence of acs  - ekg is a sr without worrisome findings to suggest ischemia  - troponin is negative, and will repeat in 2-3 hours  - history of LAD lesion on CT, with only mild non obstructive CAD on cath in 2014.  - start as 81 mg po daily  - cont ace inhibitor and statin  - if 2nd troponin is negative, no issue with dc home. He will follow up in the office for an echo and nuclear stress test.

## 2022-02-25 NOTE — ED PROVIDER NOTE - CARE PROVIDER_API CALL
Justin Fitzpatrick)  Cardiovascular Disease; Internal Medicine  [address], [place] [id number]  Phone: [phone]  Fax: [phone]  Follow Up Time:     Fermín Marie)  Orthopaedic Surgery  [address] [facility], [address] [place] [address]  Phone: [phone]  Fax: [phone]  Follow Up Time:

## 2022-02-25 NOTE — ED PROVIDER NOTE - NSFOLLOWUPINSTRUCTIONS_ED_ALL_ED_FT
Return to the ED for any new or worsening symptoms  Take your medication as prescribed  Begin Aspirin 81 mg daily  Follow up with cardiology as discussed for outpatient stress testing  Consider follow up with orthopedics for additional testing as discussed  Advance activity as tolerated    Nonspecific Chest Pain      Chest pain can be caused by many different conditions. Some causes of chest pain can be life-threatening. These will require treatment right away. Serious causes of chest pain include:  •Heart attack.      •A tear in the body's main blood vessel.      •Redness and swelling (inflammation) around your heart.      •Blood clot in your lungs.      Other causes of chest pain may not be so serious. These include:  •Heartburn.      •Anxiety or stress.      •Damage to bones or muscles in your chest.      •Lung infections.      Chest pain can feel like:  •Pain or discomfort in your chest.      •Crushing, pressure, aching, or squeezing pain.       •Burning or tingling.       •Dull or sharp pain that is worse when you move, cough, or take a deep breath.       •Pain or discomfort that is also felt in your back, neck, jaw, shoulder, or arm, or pain that spreads to any of these areas.      It is hard to know whether your pain is caused by something that is serious or something that is not so serious. So it is important to see your doctor right away if you have chest pain.      Follow these instructions at home:    Medicines     •Take over-the-counter and prescription medicines only as told by your doctor.      •If you were prescribed an antibiotic medicine, take it as told by your doctor. Do not stop taking the antibiotic even if you start to feel better.        Lifestyle      •Rest as told by your doctor.      • Do not use any products that contain nicotine or tobacco, such as cigarettes, e-cigarettes, and chewing tobacco. If you need help quitting, ask your doctor.      • Do not drink alcohol.    •Make lifestyle changes as told by your doctor. These may include:  •Getting regular exercise. Ask your doctor what activities are safe for you.      •Eating a heart-healthy diet. A diet and nutrition specialist (dietitian) can help you to learn healthy eating options.      •Staying at a healthy weight.      •Treating diabetes or high blood pressure, if needed.      •Lowering your stress. Activities such as yoga and relaxation techniques can help.        General instructions     •Pay attention to any changes in your symptoms. Tell your doctor about them or any new symptoms.      •Avoid any activities that cause chest pain.      •Keep all follow-up visits as told by your doctor. This is important. You may need more testing if your chest pain does not go away.        Contact a doctor if:    •Your chest pain does not go away.      •You feel depressed.      •You have a fever.        Get help right away if:    •Your chest pain is worse.      •You have a cough that gets worse, or you cough up blood.      •You have very bad (severe) pain in your belly (abdomen).      •You pass out (faint).    •You have either of these for no clear reason:  •Sudden chest discomfort.      •Sudden discomfort in your arms, back, neck, or jaw.        •You have shortness of breath at any time.      •You suddenly start to sweat, or your skin gets clammy.      •You feel sick to your stomach (nauseous).      •You throw up (vomit).      •You suddenly feel lightheaded or dizzy.      •You feel very weak or tired.      •Your heart starts to beat fast, or it feels like it is skipping beats.      These symptoms may be an emergency. Do not wait to see if the symptoms will go away. Get medical help right away. Call your local emergency services (911 in the [place] Do not drive yourself to the hospital.       Summary    •Chest pain can be caused by many different conditions. The cause may be serious and need treatment right away. If you have chest pain, see your doctor right away.      •Follow your doctor's instructions for taking medicines and making lifestyle changes.       •Keep all follow-up visits as told by your doctor. This includes visits for any further testing if your chest pain does not go away.      •Be sure to know the signs that show that your condition has become worse. Get help right away if you have these symptoms.      This information is not intended to replace advice given to you by your health care provider. Make sure you discuss any questions you have with your health care provider.

## 2022-02-25 NOTE — CONSULT NOTE ADULT - SUBJECTIVE AND OBJECTIVE BOX
[facility] - Joshua Bravo, Renetta, Annabel, Genevieve, Amari Escobedo  Office Number: [phone]    Initial Consult Note    CHIEF COMPLAINT: Patient is a 61y old  Male who presents with a chief complaint of left arm and chest pain.     HPI:  60 yo male who presents to the ED with a cc of left arm tingling. PMHx of anxiety, h/o bradycardia, HLD, PRASAD, pre-DM, h/o sebaceous cyst. Pt reports that last Thursday he received his Pfizer booster in his right arm. He reports that on  he noted some tingling to his left arm. He reports that last night he also began to feel warm. Pt denies fever. Pt reports that the tingling to his left arm is intermittent. Pt denies tingling at this time but does note some discomfort to his left arm. Denies any injury to the arm, denies any heavy lifting. Pt reports that when he is experiencing the pain or tingling the symptoms radiate from his shoulder into his wrist but not hand. Denies any provoking or relieving symptoms. Pt also notes 4 days of nausea and some intermittent chest tightness. Denies V/D/C, CP, SOB, abd pain. Denies HA, visual changes. Denies neck pain. Pt took ASA at 3 am. Of note wife reports that pt had clean heart cath .    PAST MEDICAL & SURGICAL HISTORY:  Hypercholesterolemia    Prediabetes    PRASAD (obstructive sleep apnea)  On CPAP    Sebaceous cyst    Bradycardia    Anxiety    H/O colonoscopy        SOCIAL HISTORY:  No tobacco, ethanol, or drug abuse.    FAMILY HISTORY:  Family history of stroke (Father)  Father -  age 70    Family history of stroke (Mother)  Mother -  age 85        MEDICATIONS  (STANDING):  cyclobenzaprine 10 milliGRAM(s) Oral once  naproxen 500 milliGRAM(s) Oral Once    MEDICATIONS  (PRN):      Allergies    Cipro (Rash)    Intolerances        REVIEW OF SYSTEMS:    CONSTITUTIONAL: No weakness, fevers or chills  EYES/ENT: No visual changes;  No vertigo or throat pain   NECK: No pain or stiffness  RESPIRATORY: No cough, wheezing, hemoptysis; No shortness of breath  CARDIOVASCULAR: No chest pain or palpitations  GASTROINTESTINAL: No abdominal pain. No nausea, vomiting, or hematemesis; No diarrhea or constipation. No melena or hematochezia.  GENITOURINARY: No dysuria, frequency or hematuria  NEUROLOGICAL: No numbness or weakness  SKIN: No itching or rash  All other review of systems is negative unless indicated above    VITAL SIGNS:   Vital Signs Last 24 Hrs  T(C): 36.6 (2022 09:35), Max: 36.6 (2022 09:35)  T(F): 97.8 (2022 09:35), Max: 97.8 (2022 09:35)  HR: 56 (2022 09:35) (56 - 56)  BP: 165/91 (2022 09:35) (165/91 - 165/91)  BP(mean): --  RR: 18 (2022 09:35) (18 - 18)  SpO2: 98% (2022 09:35) (98% - 98%)    I&O's Summary      On Exam:    Constitutional: NAD, alert and oriented x 3  Lungs:  Non-labored, breath sounds are clear bilaterally, No wheezing, rales or rhonchi  Cardiovascular: RRR.  S1 and S2 positive.  No murmurs, rubs, gallops or clicks  Gastrointestinal: Bowel Sounds present, soft, nontender.   Lymph: No peripheral edema. No cervical lymphadenopathy.  Neurological: Alert, no focal deficits  Skin: No rashes or ulcers   Psych:  Mood & affect appropriate.    LABS: All Labs Reviewed:                        13.9   6.65  )-----------( 252      ( 2022 10:18 )             39.6     2022 10:18    136    |  104    |  8      ----------------------------<  121    4.0     |  26     |  1.10     Ca    9.1        2022 10:18  Phos  3.0       2022 10:18  Mg     2.1       2022 10:18    TPro  7.1    /  Alb  3.9    /  TBili  0.9    /  DBili  x      /  AST  25     /  ALT  39     /  AlkPhos  37     2022 10:18      CARDIAC MARKERS ( 2022 10:18 )  x     / x     / 225 U/L / x     / x          Blood Culture:         RADIOLOGY:    EKG: sr

## 2022-02-25 NOTE — ED PROVIDER NOTE - PROGRESS NOTE DETAILS
results of labs and images reviewed, copy provided, all questions answered. 2nd set of troponin negative. Advised to begin Aspirin 81 mg daily. Advised to follow up with cardiology, also advised if symptoms continue will need to follow up with orthopedics for additional testing including but not limited to MRI to exclude possible disc herniation. Pt refused medication in the ED

## 2022-03-03 ENCOUNTER — APPOINTMENT (OUTPATIENT)
Dept: CARDIOLOGY | Facility: CLINIC | Age: 62
End: 2022-03-03
Payer: COMMERCIAL

## 2022-03-03 ENCOUNTER — MED ADMIN CHARGE (OUTPATIENT)
Age: 62
End: 2022-03-03

## 2022-03-03 PROCEDURE — 93306 TTE W/DOPPLER COMPLETE: CPT

## 2022-03-03 RX ORDER — PERFLUTREN 6.52 MG/ML
6.52 INJECTION, SUSPENSION INTRAVENOUS
Qty: 2 | Refills: 0 | Status: COMPLETED | OUTPATIENT
Start: 2022-03-03

## 2022-03-03 RX ADMIN — PERFLUTREN MG/ML: 6.52 INJECTION, SUSPENSION INTRAVENOUS at 00:00

## 2022-03-09 ENCOUNTER — NON-APPOINTMENT (OUTPATIENT)
Age: 62
End: 2022-03-09

## 2022-03-11 ENCOUNTER — APPOINTMENT (OUTPATIENT)
Dept: CARDIOLOGY | Facility: CLINIC | Age: 62
End: 2022-03-11
Payer: COMMERCIAL

## 2022-03-11 PROCEDURE — 93015 CV STRESS TEST SUPVJ I&R: CPT

## 2022-03-11 PROCEDURE — A9500: CPT

## 2022-03-11 PROCEDURE — 78452 HT MUSCLE IMAGE SPECT MULT: CPT

## 2022-03-16 ENCOUNTER — NON-APPOINTMENT (OUTPATIENT)
Age: 62
End: 2022-03-16

## 2022-03-16 ENCOUNTER — APPOINTMENT (OUTPATIENT)
Dept: CARDIOLOGY | Facility: CLINIC | Age: 62
End: 2022-03-16
Payer: COMMERCIAL

## 2022-03-16 VITALS
SYSTOLIC BLOOD PRESSURE: 121 MMHG | HEIGHT: 63 IN | WEIGHT: 146 LBS | OXYGEN SATURATION: 96 % | HEART RATE: 64 BPM | DIASTOLIC BLOOD PRESSURE: 75 MMHG | BODY MASS INDEX: 25.87 KG/M2

## 2022-03-16 PROCEDURE — 93000 ELECTROCARDIOGRAM COMPLETE: CPT

## 2022-03-16 PROCEDURE — 99214 OFFICE O/P EST MOD 30 MIN: CPT

## 2022-03-16 RX ORDER — METFORMIN HYDROCHLORIDE 500 MG/1
500 TABLET, COATED ORAL
Qty: 90 | Refills: 0 | Status: ACTIVE | COMMUNITY
Start: 2022-01-28

## 2022-03-16 RX ORDER — ROSUVASTATIN CALCIUM 40 MG/1
40 TABLET, FILM COATED ORAL DAILY
Qty: 90 | Refills: 3 | Status: ACTIVE | COMMUNITY
Start: 2022-03-16 | End: 1900-01-01

## 2022-03-21 NOTE — PHYSICAL EXAM
[Well Developed] : well developed [Well Nourished] : well nourished [No Acute Distress] : no acute distress [Normal Conjunctiva] : normal conjunctiva [Normal Venous Pressure] : normal venous pressure [No Carotid Bruit] : no carotid bruit [Normal S1, S2] : normal S1, S2 [No Murmur] : no murmur [No Rub] : no rub [No Gallop] : no gallop [Clear Lung Fields] : clear lung fields [Good Air Entry] : good air entry [No Respiratory Distress] : no respiratory distress  [Soft] : abdomen soft [Non Tender] : non-tender [No Masses/organomegaly] : no masses/organomegaly [Normal Bowel Sounds] : normal bowel sounds [Normal Gait] : normal gait [No Edema] : no edema [No Clubbing] : no clubbing [No Cyanosis] : no cyanosis [No Varicosities] : no varicosities [No Rash] : no rash [No Skin Lesions] : no skin lesions [Moves all extremities] : moves all extremities [No Focal Deficits] : no focal deficits [Normal Speech] : normal speech [Alert and Oriented] : alert and oriented [Normal memory] : normal memory For information on Fall & Injury Prevention, visit [email or web address]

## 2022-03-21 NOTE — CARDIOLOGY SUMMARY
[de-identified] : SR [de-identified] : 3/11/21 13 MET SPECT normal  [de-identified] : 3/2022 normal LV function.  [de-identified] : 2014 minor luminal irregularities.  [de-identified] : 2/24/22 carotid mild to moderate carotid disease

## 2022-03-21 NOTE — HISTORY OF PRESENT ILLNESS
[FreeTextEntry1] : 61 year old man with a history of PRASAD with CPAP, HTN, DM, HLD presents for a followup visit. \par \par He started to have left arm pain. He started to have intermittent left side chest pain that would be apparent at night when he laid down. It happened daily for about 2 weeks. His symptoms have resolved. \par \par He is generally sedentary.  He   denies any , PND, orthopnea, lower extremity edema, near syncope, syncope, strokelike symptoms.

## 2022-03-21 NOTE — DISCUSSION/SUMMARY
[FreeTextEntry1] : 61 year man with a history as listed presents for a followup cardiac evaluation. \par Dariel had a bout of nonanginal chest pain. His EKG did not reveal any significant ischemic changes. His workup has been negative. His blood pressure and heart rate are controlled. He will continue Enalapril. \par Given his  minor CAD he will switch his Lipitor to Crestor. He will continue with statin therapy to achieve maintain goal LDL<100. \par Exercise and diet counseling was performed in order to reduce her future cardiovascular risk.\par He will followup with me in 6-12 months or sooner if necessary.

## 2022-03-24 ENCOUNTER — APPOINTMENT (OUTPATIENT)
Dept: CARDIOLOGY | Facility: CLINIC | Age: 62
End: 2022-03-24

## 2022-11-02 ENCOUNTER — NON-APPOINTMENT (OUTPATIENT)
Age: 62
End: 2022-11-02

## 2022-11-02 ENCOUNTER — APPOINTMENT (OUTPATIENT)
Dept: CARDIOLOGY | Facility: CLINIC | Age: 62
End: 2022-11-02

## 2022-11-02 VITALS
OXYGEN SATURATION: 98 % | WEIGHT: 146 LBS | SYSTOLIC BLOOD PRESSURE: 142 MMHG | HEIGHT: 63 IN | DIASTOLIC BLOOD PRESSURE: 81 MMHG | BODY MASS INDEX: 25.87 KG/M2 | HEART RATE: 59 BPM

## 2022-11-02 DIAGNOSIS — I25.10 ATHEROSCLEROTIC HEART DISEASE OF NATIVE CORONARY ARTERY W/OUT ANGINA PECTORIS: ICD-10-CM

## 2022-11-02 PROCEDURE — 93000 ELECTROCARDIOGRAM COMPLETE: CPT

## 2022-11-02 PROCEDURE — 99214 OFFICE O/P EST MOD 30 MIN: CPT

## 2022-11-02 RX ORDER — EZETIMIBE 10 MG/1
10 TABLET ORAL
Qty: 90 | Refills: 3 | Status: ACTIVE | COMMUNITY
Start: 2022-11-02 | End: 1900-01-01

## 2022-11-02 NOTE — CARDIOLOGY SUMMARY
[de-identified] : SR [de-identified] : 3/11/22 13 MET excellent SPECT normal  [de-identified] : 3/2022 normal LV function.  [de-identified] : 2014 minor luminal irregularities.  [de-identified] : 2/24/22 carotid mild to moderate carotid disease

## 2022-11-02 NOTE — DISCUSSION/SUMMARY
[FreeTextEntry1] : 62  year man with a history as listed presents for a followup cardiac evaluation. \par Dariel is doing well. He denies any anginal symptoms. Clinically he is euvolemic on exam. . His EKG did not reveal any significant ischemic changes. His workup has been negative. His blood pressure is hihg. I will increase his enalapril to 20mg Qday. He will try to maintain a BP log at home. Reducing dietary salt intake advised. \par He will continue with statin therapy to achieve maintain goal LDL<100. he is not at goal. i will add Zetia. \par Exercise and diet counseling was performed in order to reduce her future cardiovascular risk.\par He will followup with me in 6  months or sooner if necessary.

## 2022-11-02 NOTE — HISTORY OF PRESENT ILLNESS
[FreeTextEntry1] : 61 year old man with a history of PRASAD with CPAP, HTN, DM, HLD presents for a followup visit. \par \par Since his last visit his arm and chest pain have resolved.   He   denies any dyspnea, PND, orthopnea, lower extremity edema, near syncope, syncope, strokelike symptoms. he is using a treadmill 3 days a week for about 20min at about 3mph.

## 2023-02-14 ENCOUNTER — APPOINTMENT (OUTPATIENT)
Dept: ORTHOPEDIC SURGERY | Facility: CLINIC | Age: 63
End: 2023-02-14
Payer: COMMERCIAL

## 2023-02-14 VITALS — HEIGHT: 63 IN | BODY MASS INDEX: 26.58 KG/M2 | WEIGHT: 150 LBS

## 2023-02-14 DIAGNOSIS — M50.90 CERVICAL DISC DISORDER, UNSPECIFIED, UNSPECIFIED CERVICAL REGION: ICD-10-CM

## 2023-02-14 PROCEDURE — 72040 X-RAY EXAM NECK SPINE 2-3 VW: CPT

## 2023-02-14 PROCEDURE — 99204 OFFICE O/P NEW MOD 45 MIN: CPT

## 2023-02-14 RX ORDER — METHYLPREDNISOLONE 4 MG/1
4 TABLET ORAL
Qty: 1 | Refills: 0 | Status: ACTIVE | COMMUNITY
Start: 2023-02-14 | End: 1900-01-01

## 2023-02-14 NOTE — ASSESSMENT
[FreeTextEntry1] : X-Ray Examination of the CERVICAL SPINE 3 views (or less) shows: straightening consistent with spasm and disc space narrowing. \par \par exacb of chronic underlying cerv deg disc disease\par \par - We discussed their diagnosis and treatment options at length including the risks and benefits of both surgical and non-surgical options.\par - We will conservative treatment with a course of PT and anti-inflammatory medication.\par - Rx given for Medrol dose pack\par - Discussed the possible side effects of medication along with the timing and frequency for taking.\par - If they do not make an appropriate improvement with therapy we discussed that we will obtain and MRI at their next visit.\par \par \par

## 2023-02-14 NOTE — HISTORY OF PRESENT ILLNESS
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  pain located at the right cervical musculature with associated stiffness.  worse with prolonged postures, better at rest.  has not tried meds or icing.\par

## 2023-02-14 NOTE — IMAGING
[de-identified] : NECK:\par Inspection: no ecchymosis. \par Palpation: trapezial tenderness. \par Range of motion:  Full range of motion with mild stiffness . Pain at extremes of rotation to right. \par Strength Testing: motor exam is non-focal throughout both lower extremities\par Normal Deltoid, Biceps, Triceps, Wrist Flexors, Finger Abductors, Grasp \par Neurological testing: light touch is intact throughout both upper extremities\par Lowe reflex: neg\par Spurling test: neg\par \par

## 2023-03-23 ENCOUNTER — NON-APPOINTMENT (OUTPATIENT)
Age: 63
End: 2023-03-23

## 2023-05-10 ENCOUNTER — APPOINTMENT (OUTPATIENT)
Dept: CARDIOLOGY | Facility: CLINIC | Age: 63
End: 2023-05-10

## 2023-06-21 ENCOUNTER — APPOINTMENT (OUTPATIENT)
Dept: VASCULAR SURGERY | Facility: CLINIC | Age: 63
End: 2023-06-21

## 2023-06-28 ENCOUNTER — APPOINTMENT (OUTPATIENT)
Dept: VASCULAR SURGERY | Facility: CLINIC | Age: 63
End: 2023-06-28
Payer: COMMERCIAL

## 2023-06-28 VITALS
HEART RATE: 48 BPM | SYSTOLIC BLOOD PRESSURE: 127 MMHG | OXYGEN SATURATION: 100 % | WEIGHT: 140 LBS | BODY MASS INDEX: 24.8 KG/M2 | DIASTOLIC BLOOD PRESSURE: 80 MMHG | HEIGHT: 63 IN

## 2023-06-28 PROCEDURE — 93970 EXTREMITY STUDY: CPT

## 2023-06-28 PROCEDURE — 99213 OFFICE O/P EST LOW 20 MIN: CPT

## 2023-06-28 NOTE — ASSESSMENT
[FreeTextEntry1] : Mr. SYD MILTON is a 63 year with bilateral lower extremity venous insufficiency, CEAP classification C2.  Pt has never worn compression stockings. Patient has intact pulses in both legs without evidence of arterial insufficiency.  \par \par \par

## 2023-06-28 NOTE — HISTORY OF PRESENT ILLNESS
[FreeTextEntry1] : Mr. SYD MILTON is a 63 year M who presents for initial evaluation of right leg discomfort for the past month. Mr. MILTON leg pain is associated with fatigue, achiness, and muscle cramping.  His symptoms interfere with the his ADLs such as work and daily chores.  \par \par Mr. MILTON risks factor for venous disease are family history of venous disease and varicose veins. 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 No previous treatment, vein procedures and vein surgeries.  He denies history of lower extremity claudication, rest pain, or tissue loss. \par \par \par

## 2023-06-28 NOTE — DATA REVIEWED
[FreeTextEntry1] : 6/28/23: Bilateral venous doppler: Right - GSV reflux proximal calf to distal calf > 2 sec, 3mm, SSV reflux > 2 sec, SPFJ 7mm, Left  - no GSV reflux, SSV reflux > 2 sec, SPFJ 6mm, incompetent tributaries posteriorly

## 2023-06-28 NOTE — PHYSICAL EXAM
[1+] : left 1+ [Ankle Swelling (On Exam)] : not present [Varicose Veins Of Lower Extremities] : bilaterally [Ankle Swelling On The Left] : moderate [] : not present [FreeTextEntry1] : LE vein findings: \par Varicosities bilateral, running posterior calf\par No Edema, Skin changes, No Ulcer \par CEAP classification C2\par Palpable DP pulses bilaterally

## 2023-09-19 ENCOUNTER — APPOINTMENT (OUTPATIENT)
Dept: CARDIOLOGY | Facility: CLINIC | Age: 63
End: 2023-09-19
Payer: COMMERCIAL

## 2023-09-19 VITALS
WEIGHT: 156 LBS | HEIGHT: 63 IN | SYSTOLIC BLOOD PRESSURE: 146 MMHG | HEART RATE: 55 BPM | BODY MASS INDEX: 27.64 KG/M2 | OXYGEN SATURATION: 99 % | DIASTOLIC BLOOD PRESSURE: 91 MMHG

## 2023-09-19 DIAGNOSIS — R00.1 BRADYCARDIA, UNSPECIFIED: ICD-10-CM

## 2023-09-19 PROCEDURE — 93000 ELECTROCARDIOGRAM COMPLETE: CPT

## 2023-09-19 PROCEDURE — 99214 OFFICE O/P EST MOD 30 MIN: CPT

## 2023-09-27 ENCOUNTER — APPOINTMENT (OUTPATIENT)
Dept: VASCULAR SURGERY | Facility: CLINIC | Age: 63
End: 2023-09-27

## 2024-01-26 ENCOUNTER — APPOINTMENT (OUTPATIENT)
Dept: ORTHOPEDIC SURGERY | Facility: CLINIC | Age: 64
End: 2024-01-26

## 2024-02-09 NOTE — ASU DISCHARGE PLAN (ADULT/PEDIATRIC). - ACCOMPANYING ADULT'S SIGNATURE_______________________________________
MEDICATIONS  (STANDING):  atorvastatin 20 milliGRAM(s) Oral at bedtime  clopidogrel Tablet 75 milliGRAM(s) Oral daily  folic acid 1 milliGRAM(s) Oral daily  influenza   Vaccine 0.5 milliLiter(s) IntraMuscular once  metoprolol succinate ER 25 milliGRAM(s) Oral daily  multivitamin 1 Tablet(s) Oral daily  polyethylene glycol 3350 17 Gram(s) Oral two times a day  thiamine 100 milliGRAM(s) Oral daily    MEDICATIONS  (PRN):  haloperidol     Tablet 5 milliGRAM(s) Oral every 6 hours PRN agitation  hemorrhoidal Ointment 1 Application(s) Rectal four times a day PRN hemorrhoids  LORazepam     Tablet 2 milliGRAM(s) Oral every 6 hours PRN agitation  LORazepam     Tablet 2 milliGRAM(s) Oral every 2 hours PRN CIWA score increase by 2 points and current CIWA score GREATER THAN 9  melatonin. 3 milliGRAM(s) Oral at bedtime PRN Insomnia   Statement Selected MEDICATIONS  (STANDING):  atorvastatin 20 milliGRAM(s) Oral at bedtime  clopidogrel Tablet 75 milliGRAM(s) Oral daily  folic acid 1 milliGRAM(s) Oral daily  influenza   Vaccine 0.5 milliLiter(s) IntraMuscular once  lamoTRIgine 25 milliGRAM(s) Oral daily  metoprolol succinate ER 25 milliGRAM(s) Oral daily  multivitamin 1 Tablet(s) Oral daily  OLANZapine 2.5 milliGRAM(s) Oral at bedtime  polyethylene glycol 3350 17 Gram(s) Oral two times a day  thiamine 100 milliGRAM(s) Oral daily    MEDICATIONS  (PRN):  haloperidol     Tablet 5 milliGRAM(s) Oral every 6 hours PRN agitation  hemorrhoidal Ointment 1 Application(s) Rectal four times a day PRN hemorrhoids  LORazepam     Tablet 2 milliGRAM(s) Oral every 2 hours PRN CIWA score increase by 2 points and current CIWA score GREATER THAN 9  LORazepam     Tablet 2 milliGRAM(s) Oral every 6 hours PRN agitation  melatonin. 3 milliGRAM(s) Oral at bedtime PRN Insomnia

## 2024-04-10 ENCOUNTER — NON-APPOINTMENT (OUTPATIENT)
Age: 64
End: 2024-04-10

## 2024-04-10 ENCOUNTER — APPOINTMENT (OUTPATIENT)
Dept: CARDIOLOGY | Facility: CLINIC | Age: 64
End: 2024-04-10
Payer: COMMERCIAL

## 2024-04-10 VITALS
BODY MASS INDEX: 25.69 KG/M2 | HEART RATE: 56 BPM | OXYGEN SATURATION: 99 % | HEIGHT: 63 IN | DIASTOLIC BLOOD PRESSURE: 81 MMHG | WEIGHT: 145 LBS | SYSTOLIC BLOOD PRESSURE: 131 MMHG

## 2024-04-10 PROCEDURE — G2211 COMPLEX E/M VISIT ADD ON: CPT

## 2024-04-10 PROCEDURE — 99214 OFFICE O/P EST MOD 30 MIN: CPT

## 2024-04-10 PROCEDURE — 93000 ELECTROCARDIOGRAM COMPLETE: CPT

## 2024-04-10 RX ORDER — ENALAPRIL MALEATE 20 MG/1
20 TABLET ORAL TWICE DAILY
Qty: 180 | Refills: 3 | Status: DISCONTINUED | COMMUNITY
Start: 2022-02-05 | End: 2024-04-10

## 2024-04-10 RX ORDER — OLMESARTAN MEDOXOMIL 40 MG/1
40 TABLET, FILM COATED ORAL
Qty: 90 | Refills: 3 | Status: ACTIVE | COMMUNITY
Start: 2024-04-10 | End: 1900-01-01

## 2024-04-10 NOTE — HISTORY OF PRESENT ILLNESS
[FreeTextEntry1] : 63 year old man with a history of PRASAD on CPAP, HTN, DM, HLD presents for a followup visit.   Since his last visit he has been doing well.  He   denies any chest pain, dyspnea, PND, orthopnea, lower extremity edema, near syncope, syncope, strokelike symptoms. he is using a treadmill 3  days a week for about 20min at about 3mph. He is not using his CPAP.  Medication reconciliation performed.  his home BPs are 150. he never increased the enalapril.

## 2024-04-10 NOTE — CARDIOLOGY SUMMARY
[de-identified] : Marked sinus Bradycardia  [de-identified] : 3/11/22 13 MET excellent SPECT normal  [de-identified] : 3/2022 normal LV function.  [de-identified] : 2014 minor luminal irregularities.  [de-identified] : 2/24/22 carotid mild to moderate carotid disease

## 2024-04-10 NOTE — DISCUSSION/SUMMARY
[FreeTextEntry1] : 63  year man with a history as listed presents for a followup cardiac evaluation.  Dariel is doing well. He denies any anginal symptoms. Clinically he is euvolemic on exam. . His EKG did not reveal any significant ischemic changes. His workup has been negative.  His blood pressure is high. he did not increaser the enalapril 20mg Q12. He has agreed to try olmesartan 40mg Qday.   He will try to maintain a BP log at home. Reducing dietary salt intake advised.  He will continue with statin therapy to achieve maintain goal LDL<100.  He will continue  Zetia.  Exercise and diet counseling was performed in order to reduce her future cardiovascular risk. He will followup with me in 6 months or sooner if necessary.  [EKG obtained to assist in diagnosis and management of assessed problem(s)] : EKG obtained to assist in diagnosis and management of assessed problem(s)

## 2024-06-06 ENCOUNTER — EMERGENCY (EMERGENCY)
Facility: HOSPITAL | Age: 64
LOS: 1 days | Discharge: HOME | End: 2024-06-06
Attending: EMERGENCY MEDICINE | Admitting: EMERGENCY MEDICINE
Payer: COMMERCIAL

## 2024-06-06 VITALS
DIASTOLIC BLOOD PRESSURE: 60 MMHG | WEIGHT: 149.91 LBS | RESPIRATION RATE: 16 BRPM | TEMPERATURE: 98 F | OXYGEN SATURATION: 100 % | SYSTOLIC BLOOD PRESSURE: 130 MMHG | HEIGHT: 63 IN | HEART RATE: 74 BPM

## 2024-06-06 VITALS
OXYGEN SATURATION: 97 % | DIASTOLIC BLOOD PRESSURE: 84 MMHG | TEMPERATURE: 98 F | SYSTOLIC BLOOD PRESSURE: 128 MMHG | HEART RATE: 70 BPM | RESPIRATION RATE: 19 BRPM

## 2024-06-06 DIAGNOSIS — Z98.890 OTHER SPECIFIED POSTPROCEDURAL STATES: Chronic | ICD-10-CM

## 2024-06-06 PROCEDURE — 99284 EMERGENCY DEPT VISIT MOD MDM: CPT

## 2024-06-06 PROCEDURE — 99284 EMERGENCY DEPT VISIT MOD MDM: CPT | Mod: 25

## 2024-06-06 PROCEDURE — 93971 EXTREMITY STUDY: CPT | Mod: 26,RT

## 2024-06-06 PROCEDURE — 93971 EXTREMITY STUDY: CPT

## 2024-06-06 RX ORDER — IBUPROFEN 200 MG
400 TABLET ORAL ONCE
Refills: 0 | Status: COMPLETED | OUTPATIENT
Start: 2024-06-06 | End: 2024-06-06

## 2024-06-06 RX ADMIN — Medication 400 MILLIGRAM(S): at 20:07

## 2024-06-06 NOTE — ED ADULT NURSE NOTE - OBJECTIVE STATEMENT
Pt. received alert and oriented x3 with chief complaint of sudden onset of right lower extremity posterior edema. Pt. states this has previously happened about one year ago and went away. Pt. denies CP/SOB.

## 2024-06-06 NOTE — ED PROVIDER NOTE - PATIENT PORTAL LINK FT
You can access the FollowMyHealth Patient Portal offered by [facility] by registering at the following website: [email or web address]. By joining [facility]’s FollowMyHealth portal, you will also be able to view your health information using other applications (apps) compatible with our system.

## 2024-06-06 NOTE — ED PROVIDER NOTE - NSFOLLOWUPINSTRUCTIONS_ED_ALL_ED_FT
Follow-up with your primary care physician, take over-the-counter ibuprofen 200mg 2 tablets every 4-6 hours as needed for pain.  Return to the emergency department if having severe pain, increased redness, swelling and or worsening of symptoms.

## 2024-06-06 NOTE — ED PROVIDER NOTE - CLINICAL SUMMARY MEDICAL DECISION MAKING FREE TEXT BOX
64 male complaining of right lower extremity discomfort and swelling, no signs of cellulitis, positive dorsal pedal pulse, will follow-up ultrasound rule out DVT, pain control and reevaluate

## 2024-06-06 NOTE — ED PROVIDER NOTE - CARE PROVIDER_API CALL
Aby Langley  Internal Medicine  [address] [place] [address]  Phone: [phone]  Fax: [phone]  Follow Up Time:

## 2024-06-06 NOTE — ED ADULT NURSE NOTE - NSFALLUNIVINTERV_ED_ALL_ED
Bed/Stretcher in lowest position, wheels locked, appropriate side rails in place/Call bell, personal items and telephone in reach/Instruct patient to call for assistance before getting out of bed/chair/stretcher/Non-slip footwear applied when patient is off stretcher/[place] to call system/Physically safe environment - no spills, clutter or unnecessary equipment/Purposeful proactive rounding/Room/bathroom lighting operational, light cord in reach

## 2024-06-06 NOTE — ED PROVIDER NOTE - OBJECTIVE STATEMENT
64-year-old male PMH of PRASAD on CPAP, HTN, DM2 on metformin, HLD, presents to the emergency department states that this morning he noticed aching of his right lower extremity, and is concerned that it got swollen and came to the emergency department.

## 2024-06-12 ENCOUNTER — APPOINTMENT (OUTPATIENT)
Dept: VASCULAR SURGERY | Facility: CLINIC | Age: 64
End: 2024-06-12
Payer: COMMERCIAL

## 2024-06-12 VITALS
HEIGHT: 63 IN | OXYGEN SATURATION: 99 % | SYSTOLIC BLOOD PRESSURE: 120 MMHG | BODY MASS INDEX: 25.69 KG/M2 | WEIGHT: 145 LBS | HEART RATE: 60 BPM | RESPIRATION RATE: 14 BRPM | DIASTOLIC BLOOD PRESSURE: 62 MMHG

## 2024-06-12 DIAGNOSIS — M62.838 OTHER MUSCLE SPASM: ICD-10-CM

## 2024-06-12 DIAGNOSIS — I87.2 VENOUS INSUFFICIENCY (CHRONIC) (PERIPHERAL): ICD-10-CM

## 2024-06-12 DIAGNOSIS — I10 ESSENTIAL (PRIMARY) HYPERTENSION: ICD-10-CM

## 2024-06-12 DIAGNOSIS — I83.90 ASYMPTOMATIC VARICOSE VEINS OF UNSPECIFIED LOWER EXTREMITY: ICD-10-CM

## 2024-06-12 DIAGNOSIS — E78.5 HYPERLIPIDEMIA, UNSPECIFIED: ICD-10-CM

## 2024-06-12 DIAGNOSIS — G45.9 TRANSIENT CEREBRAL ISCHEMIC ATTACK, UNSPECIFIED: ICD-10-CM

## 2024-06-12 PROCEDURE — 93971 EXTREMITY STUDY: CPT | Mod: RT

## 2024-06-12 PROCEDURE — 99213 OFFICE O/P EST LOW 20 MIN: CPT

## 2024-06-12 NOTE — PLAN
[TextEntry] : Patient wishes to continue conservative treatment at this time. Medical grade compression stockings 20-30 mmHG to be worn daily and not at night. Leg elevation Exercise and weight loss Over the counter pain medication for discomfort Follow up in 3 months or PRN to discuss possible intervention for venous insufficiency with R SSV RFA.  I have spent a total of 20 minutes with the patient and coordinating care.

## 2024-06-12 NOTE — ASSESSMENT
[FreeTextEntry1] : Mr. SYD MILTON is a 64-year-old with persistent bilateral lower extremity venous insufficiency, CEAP classification C3.  Patient has intact pulses in both legs without evidence of arterial insufficiency.

## 2024-06-12 NOTE — HISTORY OF PRESENT ILLNESS
[FreeTextEntry1] : Mr. SYD MILTON is a 63 year M who presents for initial evaluation of right leg discomfort for the past month. Mr. MILTON leg pain is associated with fatigue, achiness, and muscle cramping.  His symptoms interfere with the his ADLs such as work and daily chores.  \par  \par  Mr. MILTON risks factor for venous disease are family history of venous disease and varicose veins. 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 No previous treatment, vein procedures and vein surgeries.  He denies history of lower extremity claudication, rest pain, or tissue loss. \par  \par  \par   [de-identified] : Mr. SYD MILTON is a 64-year-old M who presents for follow-up evaluation of right leg pain.  He recently went to the ER for RLE swelling.  Ultrasound was negative for DVT.  His symptoms have persisted despite conservative management with leg elevation, exercise, attempted weight loss, and compression stocking use for more than 3 months. His symptoms interfere with his ADLs such as work, exercise, and daily chores.

## 2024-06-12 NOTE — REVIEW OF SYSTEMS
[Limb Pain] : limb pain [Negative] : Heme/Lymph [As Noted in HPI] : as noted in HPI [Limb Swelling] : limb swelling

## 2024-06-12 NOTE — PHYSICAL EXAM
[1+] : left 1+ [Varicose Veins Of Lower Extremities] : bilaterally [Ankle Swelling On The Left] : moderate [Ankle Swelling (On Exam)] : present [Ankle Swelling On The Right] : mild [] : not present [FreeTextEntry1] : Varicosities bilateral, running posterior calf slight RLE Edema, No Skin changes, No Ulcer  CEAP classification C3 Palpable DP pulses bilaterally

## 2024-06-12 NOTE — DATA REVIEWED
[FreeTextEntry1] : 6/28/23: Bilateral venous doppler: Right - GSV reflux proximal calf to distal calf > 2 sec, 3mm, SSV reflux > 2 sec, SPFJ 7mm, Left  - no GSV reflux, SSV reflux > 2 sec, SPFJ 6mm, incompetent tributaries posteriorly  6/12/24: Right lower extremity venous ultrasound: no dvt/svt, SSV reflux from SPJ (6.7mm) to distal calf.

## 2024-11-12 ENCOUNTER — EMERGENCY (EMERGENCY)
Facility: HOSPITAL | Age: 64
LOS: 1 days | Discharge: HOME | End: 2024-11-12
Attending: EMERGENCY MEDICINE | Admitting: EMERGENCY MEDICINE
Payer: COMMERCIAL

## 2024-11-12 VITALS
SYSTOLIC BLOOD PRESSURE: 161 MMHG | OXYGEN SATURATION: 98 % | DIASTOLIC BLOOD PRESSURE: 94 MMHG | HEIGHT: 63 IN | WEIGHT: 145.06 LBS | RESPIRATION RATE: 18 BRPM | HEART RATE: 53 BPM | TEMPERATURE: 98 F

## 2024-11-12 VITALS
OXYGEN SATURATION: 98 % | RESPIRATION RATE: 18 BRPM | HEART RATE: 62 BPM | SYSTOLIC BLOOD PRESSURE: 148 MMHG | TEMPERATURE: 98 F | DIASTOLIC BLOOD PRESSURE: 90 MMHG

## 2024-11-12 DIAGNOSIS — Z98.890 OTHER SPECIFIED POSTPROCEDURAL STATES: Chronic | ICD-10-CM

## 2024-11-12 LAB
ALBUMIN SERPL ELPH-MCNC: 3.8 G/DL — SIGNIFICANT CHANGE UP (ref 3.3–5)
ALP SERPL-CCNC: 41 U/L — SIGNIFICANT CHANGE UP (ref 40–120)
ALT FLD-CCNC: 33 U/L — SIGNIFICANT CHANGE UP (ref 12–78)
ANION GAP SERPL CALC-SCNC: 6 MMOL/L — SIGNIFICANT CHANGE UP (ref 5–17)
AST SERPL-CCNC: 24 U/L — SIGNIFICANT CHANGE UP (ref 15–37)
BASOPHILS # BLD AUTO: 0.04 K/UL — SIGNIFICANT CHANGE UP (ref 0–0.2)
BASOPHILS NFR BLD AUTO: 0.7 % — SIGNIFICANT CHANGE UP (ref 0–2)
BILIRUB SERPL-MCNC: 0.6 MG/DL — SIGNIFICANT CHANGE UP (ref 0.2–1.2)
BUN SERPL-MCNC: 13 MG/DL — SIGNIFICANT CHANGE UP (ref 7–23)
CALCIUM SERPL-MCNC: 9.3 MG/DL — SIGNIFICANT CHANGE UP (ref 8.5–10.1)
CHLORIDE SERPL-SCNC: 103 MMOL/L — SIGNIFICANT CHANGE UP (ref 96–108)
CO2 SERPL-SCNC: 24 MMOL/L — SIGNIFICANT CHANGE UP (ref 22–31)
CREAT SERPL-MCNC: 1.1 MG/DL — SIGNIFICANT CHANGE UP (ref 0.5–1.3)
EGFR: 76 ML/MIN/1.73M2 — SIGNIFICANT CHANGE UP
EOSINOPHIL # BLD AUTO: 0.28 K/UL — SIGNIFICANT CHANGE UP (ref 0–0.5)
EOSINOPHIL NFR BLD AUTO: 5.1 % — SIGNIFICANT CHANGE UP (ref 0–6)
GLUCOSE SERPL-MCNC: 129 MG/DL — HIGH (ref 70–99)
HCT VFR BLD CALC: 40.4 % — SIGNIFICANT CHANGE UP (ref 39–50)
HGB BLD-MCNC: 13.8 G/DL — SIGNIFICANT CHANGE UP (ref 13–17)
IMM GRANULOCYTES NFR BLD AUTO: 0.5 % — SIGNIFICANT CHANGE UP (ref 0–0.9)
LYMPHOCYTES # BLD AUTO: 1.46 K/UL — SIGNIFICANT CHANGE UP (ref 1–3.3)
LYMPHOCYTES # BLD AUTO: 26.4 % — SIGNIFICANT CHANGE UP (ref 13–44)
MCHC RBC-ENTMCNC: 32.5 PG — SIGNIFICANT CHANGE UP (ref 27–34)
MCHC RBC-ENTMCNC: 34.2 G/DL — SIGNIFICANT CHANGE UP (ref 32–36)
MCV RBC AUTO: 95.3 FL — SIGNIFICANT CHANGE UP (ref 80–100)
MONOCYTES # BLD AUTO: 0.51 K/UL — SIGNIFICANT CHANGE UP (ref 0–0.9)
MONOCYTES NFR BLD AUTO: 9.2 % — SIGNIFICANT CHANGE UP (ref 2–14)
NEUTROPHILS # BLD AUTO: 3.2 K/UL — SIGNIFICANT CHANGE UP (ref 1.8–7.4)
NEUTROPHILS NFR BLD AUTO: 58.1 % — SIGNIFICANT CHANGE UP (ref 43–77)
NRBC # BLD: 0 /100 WBCS — SIGNIFICANT CHANGE UP (ref 0–0)
PLATELET # BLD AUTO: 242 K/UL — SIGNIFICANT CHANGE UP (ref 150–400)
POTASSIUM SERPL-MCNC: 4.5 MMOL/L — SIGNIFICANT CHANGE UP (ref 3.5–5.3)
POTASSIUM SERPL-SCNC: 4.5 MMOL/L — SIGNIFICANT CHANGE UP (ref 3.5–5.3)
PROT SERPL-MCNC: 7.3 G/DL — SIGNIFICANT CHANGE UP (ref 6–8.3)
RBC # BLD: 4.24 M/UL — SIGNIFICANT CHANGE UP (ref 4.2–5.8)
RBC # FLD: 12.5 % — SIGNIFICANT CHANGE UP (ref 10.3–14.5)
SODIUM SERPL-SCNC: 133 MMOL/L — LOW (ref 135–145)
WBC # BLD: 5.52 K/UL — SIGNIFICANT CHANGE UP (ref 3.8–10.5)
WBC # FLD AUTO: 5.52 K/UL — SIGNIFICANT CHANGE UP (ref 3.8–10.5)

## 2024-11-12 PROCEDURE — 85025 COMPLETE CBC W/AUTO DIFF WBC: CPT

## 2024-11-12 PROCEDURE — 99284 EMERGENCY DEPT VISIT MOD MDM: CPT | Mod: 25

## 2024-11-12 PROCEDURE — 72100 X-RAY EXAM L-S SPINE 2/3 VWS: CPT

## 2024-11-12 PROCEDURE — 96375 TX/PRO/DX INJ NEW DRUG ADDON: CPT

## 2024-11-12 PROCEDURE — 99285 EMERGENCY DEPT VISIT HI MDM: CPT

## 2024-11-12 PROCEDURE — 80053 COMPREHEN METABOLIC PANEL: CPT

## 2024-11-12 PROCEDURE — 96374 THER/PROPH/DIAG INJ IV PUSH: CPT

## 2024-11-12 PROCEDURE — 72100 X-RAY EXAM L-S SPINE 2/3 VWS: CPT | Mod: 26

## 2024-11-12 PROCEDURE — 93971 EXTREMITY STUDY: CPT | Mod: 26,RT

## 2024-11-12 PROCEDURE — 93971 EXTREMITY STUDY: CPT

## 2024-11-12 PROCEDURE — 36415 COLL VENOUS BLD VENIPUNCTURE: CPT

## 2024-11-12 RX ORDER — ACETAMINOPHEN 500 MG
1000 TABLET ORAL ONCE
Refills: 0 | Status: COMPLETED | OUTPATIENT
Start: 2024-11-12 | End: 2024-11-12

## 2024-11-12 RX ORDER — KETOROLAC TROMETHAMINE 30 MG/ML
15 INJECTION INTRAMUSCULAR; INTRAVENOUS ONCE
Refills: 0 | Status: DISCONTINUED | OUTPATIENT
Start: 2024-11-12 | End: 2024-11-12

## 2024-11-12 RX ADMIN — Medication 400 MILLIGRAM(S): at 08:20

## 2024-11-12 RX ADMIN — Medication 1000 MILLIGRAM(S): at 09:20

## 2024-11-12 RX ADMIN — KETOROLAC TROMETHAMINE 15 MILLIGRAM(S): 30 INJECTION INTRAMUSCULAR; INTRAVENOUS at 10:34

## 2024-11-12 NOTE — ED PROVIDER NOTE - NSFOLLOWUPINSTRUCTIONS_ED_ALL_ED_FT
Radicular Pain  Back view of a person showing a normal leg and a leg affected by the pain of sciatica.   Radicular pain is a type of pain that spreads from your back or neck along a spinal nerve. Spinal nerves are nerves that leave the spinal cord and go to the muscles. Radicular pain is sometimes called radiculopathy, radiculitis, or a pinched nerve. When you have this type of pain, you may also have weakness, numbness, or tingling in the area of your body that is supplied by the nerve. The pain may feel sharp and burning. Depending on which spinal nerve is affected, the pain may occur in the:  Neck area (cervical radicular pain). You may also feel pain, numbness, weakness, or tingling in the arms.  Mid-spine area (thoracic radicular pain). You would feel this pain in the back and chest. This type is rare.  Lower back area (lumbar radicular pain). You would feel this pain as low back pain. You may feel pain, numbness, weakness, or tingling in the buttocks or legs. Sciatica is a type of lumbar radicular pain that shoots down the back of the leg.  Radicular pain occurs when one of the spinal nerves becomes irritated or squeezed (compressed). It is often caused by something pushing on a spinal nerve, such as one of the bones of the spine (vertebrae) or one of the round cushions between vertebrae (intervertebral disks). This can result from:  An injury.  Wear and tear or aging of a disk.  The growth of a bone spur that pushes on the nerve.  Radicular pain often goes away when you follow instructions from your health care provider for relieving pain at home.    How is this treated?  Treatment may depend on the cause of the condition and may include:  Working with a physical therapist.  Taking pain medicine.  Applying heat or ice or both to the affected areas.  Doing stretches to improve flexibility.  Having surgery. This may be needed if other treatments do not help. Different types of surgery may be done depending on the cause of this condition.  Follow these instructions at home:  Managing pain    Bag of ice on a towel on the skin.  A heating pad for use on the painful area.  If directed, put ice on the affected area. To do this:  Put ice in a plastic bag.  Place a towel between your skin and the bag.  Leave the ice on for 20 minutes, 2–3 times a day.  Remove the ice if your skin turns bright red. This is very important. If you cannot feel pain, heat, or cold, you have a greater risk of damage to the area.  If directed, apply heat to the affected area as often as told by your health care provider. Use the heat source that your health care provider recommends, such as a moist heat pack or a heating pad.  Place a towel between your skin and the heat source.  Leave the heat on for 20–30 minutes.  Remove the heat if your skin turns bright red. This is especially important if you are unable to feel pain, heat, or cold. You have a greater risk of getting burned.  Activity    Do not sit or rest in bed for long periods of time.  Try to stay as active as possible. Ask your health care provider what type of exercise or activity is best for you.  Avoid activities that make your pain worse, such as bending and lifting.  You may have to avoid lifting. Ask your health care provider how much you can safely lift.  Practice using proper technique when lifting items. Proper lifting technique involves bending your knees and rising up.  Do strength and range-of-motion exercises only as told by your health care provider or physical therapist.  General instructions    Take over-the-counter and prescription medicines only as told by your health care provider.  Pay attention to any changes in your symptoms.  Keep all follow-up visits. This is important.  Contact a health care provider if:  Your pain and other symptoms get worse.  Your pain medicine is not helping.  Your pain has not improved after a few weeks of home care.  You have a fever.  Get help right away if:  You have severe pain, weakness, or numbness.  You have difficulty with bladder or bowel control.  Summary  Radicular pain is a type of pain that spreads from your back or neck along a spinal nerve.  When you have radicular pain, you may also have weakness, numbness, or tingling in the area of your body that is supplied by the nerve.  The pain may feel sharp or burning.  Radicular pain may be treated with ice, heat, medicines, or physical therapy.  This information is not intended to replace advice given to you by your health care provider. Make sure you discuss any questions you have with your health care provider.        Follow up with your personal physicians this week.  Please return to the Emergency Department immediately for any problems or concerns.   Tylenol 1-2 tablets every 6 hours as needed.  Elevate leg.   Follow up with vascular surgeon this week. Dr Stinson's group. Radicular Pain  Back view of a person showing a normal leg and a leg affected by the pain of sciatica.   Radicular pain is a type of pain that spreads from your back or neck along a spinal nerve. Spinal nerves are nerves that leave the spinal cord and go to the muscles. Radicular pain is sometimes called radiculopathy, radiculitis, or a pinched nerve. When you have this type of pain, you may also have weakness, numbness, or tingling in the area of your body that is supplied by the nerve. The pain may feel sharp and burning. Depending on which spinal nerve is affected, the pain may occur in the:  Neck area (cervical radicular pain). You may also feel pain, numbness, weakness, or tingling in the arms.  Mid-spine area (thoracic radicular pain). You would feel this pain in the back and chest. This type is rare.  Lower back area (lumbar radicular pain). You would feel this pain as low back pain. You may feel pain, numbness, weakness, or tingling in the buttocks or legs. Sciatica is a type of lumbar radicular pain that shoots down the back of the leg.  Radicular pain occurs when one of the spinal nerves becomes irritated or squeezed (compressed). It is often caused by something pushing on a spinal nerve, such as one of the bones of the spine (vertebrae) or one of the round cushions between vertebrae (intervertebral disks). This can result from:  An injury.  Wear and tear or aging of a disk.  The growth of a bone spur that pushes on the nerve.  Radicular pain often goes away when you follow instructions from your health care provider for relieving pain at home.    How is this treated?  Treatment may depend on the cause of the condition and may include:  Working with a physical therapist.  Taking pain medicine.  Applying heat or ice or both to the affected areas.  Doing stretches to improve flexibility.  Having surgery. This may be needed if other treatments do not help. Different types of surgery may be done depending on the cause of this condition.  Follow these instructions at home:  Managing pain    Bag of ice on a towel on the skin.  A heating pad for use on the painful area.  If directed, put ice on the affected area. To do this:  Put ice in a plastic bag.  Place a towel between your skin and the bag.  Leave the ice on for 20 minutes, 2–3 times a day.  Remove the ice if your skin turns bright red. This is very important. If you cannot feel pain, heat, or cold, you have a greater risk of damage to the area.  If directed, apply heat to the affected area as often as told by your health care provider. Use the heat source that your health care provider recommends, such as a moist heat pack or a heating pad.  Place a towel between your skin and the heat source.  Leave the heat on for 20–30 minutes.  Remove the heat if your skin turns bright red. This is especially important if you are unable to feel pain, heat, or cold. You have a greater risk of getting burned.  Activity    Do not sit or rest in bed for long periods of time.  Try to stay as active as possible. Ask your health care provider what type of exercise or activity is best for you.  Avoid activities that make your pain worse, such as bending and lifting.  You may have to avoid lifting. Ask your health care provider how much you can safely lift.  Practice using proper technique when lifting items. Proper lifting technique involves bending your knees and rising up.  Do strength and range-of-motion exercises only as told by your health care provider or physical therapist.  General instructions    Take over-the-counter and prescription medicines only as told by your health care provider.  Pay attention to any changes in your symptoms.  Keep all follow-up visits. This is important.  Contact a health care provider if:  Your pain and other symptoms get worse.  Your pain medicine is not helping.  Your pain has not improved after a few weeks of home care.  You have a fever.  Get help right away if:  You have severe pain, weakness, or numbness.  You have difficulty with bladder or bowel control.  Summary  Radicular pain is a type of pain that spreads from your back or neck along a spinal nerve.  When you have radicular pain, you may also have weakness, numbness, or tingling in the area of your body that is supplied by the nerve.  The pain may feel sharp or burning.  Radicular pain may be treated with ice, heat, medicines, or physical therapy.  This information is not intended to replace advice given to you by your health care provider. Make sure you discuss any questions you have with your health care provider.    [employer] CareNotes®  :  [facility]        MUSCULOSKELETAL PAIN - AfterCare(R) Instructions(ER/ED)    Musculoskeletal Pain    WHAT YOU NEED TO KNOW:    Musculoskeletal pain can occur in muscles, bones, joints, ligaments, tendons, or nerves. The pain can be dull, achy, or sharp. You may have pain and tenderness to the touch as well. The pain can occur anywhere in your body. Musculoskeletal pain can be from an injury, surgery, or a medical condition such as polymyositis.    DISCHARGE INSTRUCTIONS:    Return to the emergency department if:    You have severe pain when you move the area.    You lose feeling in the area.    You have new or worse pain or swelling in the area. Your skin may feel tight.  Call your doctor if:    You have a fever.    You have pain that does not get better with treatment.    You have trouble sleeping because of your pain.    Your painful area becomes more tender, red, and warm to the touch.    You have less movement of the painful area.    You have questions or concerns about your condition or care.  Self-care:    Rest as directed. Avoid activity that causes pain. You may be able to return to normal activity when you can move without pain. Follow directions for rest and activity.    Ice the painful area to decrease pain and swelling. Use an ice pack, or put ice in a plastic bag and cover it with a towel. Always put a cloth between the ice and your skin. Apply the ice as often as directed for the first 24 to 48 hours.    Apply heat to the area as directed. Heat helps decrease pain and muscle spasms. Your healthcare provider will tell you when and how often to apply heat.    Apply compression to the area, if directed. Your provider may want you to use a splint, brace, or elastic bandage. Compression helps decrease pain and swelling. A splint, brace, or bandage will also help protect the painful area when you move around.  How to Wrap an Elastic Bandage      Elevate (raise) the painful area to reduce swelling and pain. Use pillows, blankets, or rolled towels to elevate the area above the level of your heart. Elevate the area as often as you can.    Elevate Leg      Ask your provider if alternative therapies are right for you. Examples include acupuncture, relaxation, and massage. These therapies may help reduce pain.  Medicines: You may need any of the following:    NSAIDs help decrease swelling and pain or fever. This medicine is available with or without a doctor's order. NSAIDs can cause stomach bleeding or kidney problems in certain people. If you take blood thinner medicine, always ask your healthcare provider if NSAIDs are safe for you. Always read the medicine label and follow directions.    Acetaminophen decreases pain and fever. It is available without a doctor's order. Ask how much to take and how often to take it. Follow directions. Read the labels of all other medicines you are using to see if they also contain acetaminophen, or ask your doctor or pharmacist. Acetaminophen can cause liver damage if not taken correctly.    Muscle relaxers help relax your muscles to decrease pain and muscle spasms.    Steroids may be given to decrease redness, pain, and swelling.    A pain cream, gel, or patch may be applied to your skin on painful areas.    Take your medicine as directed. Contact your healthcare provider if you think your medicine is not helping or if you have side effects. Tell your provider if you are allergic to any medicine. Keep a list of the medicines, vitamins, and herbs you take. Include the amounts, and when and why you take them. Bring the list or the pill bottles to follow-up visits. Carry your medicine list with you in case of an emergency.  Follow up with your doctor as directed: You may need more tests to help healthcare providers find the cause of your muscle pain. You may need physical therapy to learn muscle strengthening exercises. Write down your questions so you remember to ask them during your visits.    Follow up with your personal physicians this week.  Please return to the Emergency Department immediately for any problems or concerns.   Tylenol 1-2 tablets every 6 hours as needed.  Elevate leg.   Follow up with vascular surgeon this week. Dr Stinson's group.

## 2024-11-12 NOTE — ED PROVIDER NOTE - PROVIDER TOKENS
PROVIDER:[TOKEN:[[id number]],FOLLOWUP:[1-3 Days]] PROVIDER:[TOKEN:[[id number]],FOLLOWUP:[1-3 Days]],PROVIDER:[TOKEN:[[id number]],FOLLOWUP:[1-3 Days],ESTABLISHEDPATIENT:[T]] PROVIDER:[TOKEN:[[id number]],FOLLOWUP:[1-3 Days]],PROVIDER:[TOKEN:[[id number]],FOLLOWUP:[1-3 Days],ESTABLISHEDPATIENT:[T]],PROVIDER:[TOKEN:[[id number]],FOLLOWUP:[1-3 Days]]

## 2024-11-12 NOTE — ED ADULT NURSE NOTE - OBJECTIVE STATEMENT
Pt presents to Ed with  c/o  have pain in my right leg, from the mid thigh down. Pt reports he's followed up with vascular Md. Reports difficulty ambulating at times.   I have had this for a while, and I have been seen for this here already.  + B/l peripheral pulses. Reports he  took a motrin 800mg last night w/no relief.

## 2024-11-12 NOTE — ED PROVIDER NOTE - OBJECTIVE STATEMENT
64-year-old male with past medical history of hyperlipidemia hypertension type 2 diabetes on metformin chronic right lower extremity pain presents to the ED this morning complaining of recurrent right lower extremity pain x 2 days pain described as tightness in nature.  Patient denies redness swelling trauma numbness tingling pins-and-needles weakness nausea vomiting diarrhea chest pain shortness of breath cough fever chills headache back pain neck pain headache dizziness.  Patient worked up by [facility] in the past no DVTs or arterial issues found.  Patient states right lower extremity pain worse with movement. pt st has been told he had a pinched nerve in his low back in the past. resolved with oral medications as per pt. pt denies low back pain, bladder/bowel changes.  pcp= caroline perry

## 2024-11-12 NOTE — ED PROVIDER NOTE - CARE PROVIDERS DIRECT ADDRESSES
,DirectAddress_Unknown ,DirectAddress_Unknown,DirectAddress_Unknown ,DirectAddress_Unknown,DirectAddress_Unknown,[email or web address]

## 2024-11-12 NOTE — ED PROVIDER NOTE - CLINICAL SUMMARY MEDICAL DECISION MAKING FREE TEXT BOX
64-year-old male with past medical history of hyperlipidemia hypertension type 2 diabetes on metformin chronic right lower extremity pain presents to the ED this morning complaining of recurrent right lower extremity pain x 2 days pain described as tightness in nature. labs, Right LE duplex, DVT, 64-year-old male with past medical history of hyperlipidemia hypertension type 2 diabetes on metformin chronic right lower extremity pain presents to the ED this morning complaining of recurrent right lower extremity pain x 2 days pain described as tightness in nature. labs, Right LE duplex, DVT, lumbar radicular pain

## 2024-11-12 NOTE — ED PROVIDER NOTE - PHYSICAL EXAMINATION
Subjective   Pt. Seen and examined earlier this morning. Reports pain overall better, but hesitant to go home due to breakthrough pain. No cp or sob.  Did not receive dilaudid overnight.     Objective     I/O's    Intake/Output Summary (Last 24 hours) at 9/13/2023 1412  Last data filed at 9/13/2023 1138  Gross per 24 hour   Intake 200 ml   Output 1600 ml   Net -1400 ml       Last Recorded Vitals  Vitals with min/max:      Vital Last Value 24 Hour Range   Temperature 98.6 °F (37 °C) (09/13/23 1326) Temp  Min: 98.1 °F (36.7 °C)  Max: 98.6 °F (37 °C)   Pulse 98 (09/13/23 1326) Pulse  Min: 70  Max: 98   Respiratory 20 (09/13/23 1326) Resp  Min: 18  Max: 20   Non-Invasive  Blood Pressure 122/62 (09/13/23 1326) BP  Min: 105/54  Max: 122/62   Pulse Oximetry 95 % (09/13/23 1326) SpO2  Min: 95 %  Max: 98 %   Arterial   Blood Pressure   No data recorded        Body mass index is 21.55 kg/m².  GENERAL:  In no apparent distress. conversational  CHEST:  Chest with clear breath sounds bilaterally.   CARDIAC:  Regular rate and rhythm.  S1 and S2  VASCULAR:  RLE edema  ABDOMEN:  Soft, without detectable tenderness.  No   rebound or guarding  MUSCULOSKELETAL:  no calf tenderness    PSYCH:  Tearful from being in pain    Meds  Current Facility-Administered Medications   Medication Dose Route Frequency Provider Last Rate Last Admin   • ciprofloxacin (CIPRO) tablet 750 mg  750 mg Oral BID Abreakfast & HS Lupe Dill DO   750 mg at 09/13/23 1329   • enoxaparin (LOVENOX) injection 70 mg  70 mg Subcutaneous Q12H Maria Fernanda Anton MD   70 mg at 09/13/23 0920   • HYDROmorphone (DILAUDID) injection 0.5 mg  0.5 mg Intravenous Q3H PRN Kerry Guardado DO   0.5 mg at 09/13/23 1224   • bisacodyl (DULCOLAX) suppository 10 mg  10 mg Rectal Daily Carmenza Bravo DO   10 mg at 09/12/23 1727   • gabapentin (NEURONTIN) capsule 400 mg  400 mg Oral 3 times per day Lupe Dill DO   400 mg at 09/13/23 1330   • acetaminophen (TYLENOL) tablet 1,000 mg  1,000  mg Oral 3 times per day Cady Rubin MD   1,000 mg at 09/13/23 1330   • docusate sodium-sennosides (SENOKOT S) 50-8.6 MG 1 tablet  1 tablet Oral BID Mark Doran NP   1 tablet at 09/13/23 0920   • magnesium hydroxide (MILK OF MAGNESIA) 400 MG/5ML suspension 30 mL  30 mL Oral Daily PRN Mark Doran NP   30 mL at 09/12/23 1726   • sodium chloride 0.9 % flush bag 25 mL  25 mL Intravenous PRN Patrick Meneses MD       • sodium chloride (PF) 0.9 % injection 2 mL  2 mL Intracatheter 2 times per day Patrick Meneses MD   2 mL at 09/13/23 0920   • amLODIPine (NORVASC) tablet 5 mg  5 mg Oral Daily Maria Fernanda Anton MD   5 mg at 09/13/23 0919   • bictegravir-emtricitab-tenofov (BIKTARVY) -25 MG per tablet 1 tablet  1 tablet Oral Daily Maria Fernanda Anton MD   1 tablet at 09/13/23 0916   • cyclobenzaprine (FLEXERIL) tablet 5 mg  5 mg Oral TID Maria Fernanda Anton MD   5 mg at 09/13/23 1330   • DULoxetine (CYMBALTA) capsule 30 mg  30 mg Oral BID Maria Fernanda Anton MD   30 mg at 09/13/23 0920   • [Held by provider] hydroCHLOROthiazide (HYDRODIURIL) tablet 12.5 mg  12.5 mg Oral Daily Maria Fernanda Anton MD       • [Held by provider] lisinopril (ZESTRIL) tablet 40 mg  40 mg Oral Daily Maria Fernanda Anton MD       • metoPROLOL succinate (TOPROL-XL) ER tablet 50 mg  50 mg Oral Daily Maria Fernanda Anton MD   50 mg at 09/13/23 0916   • tamsulosin (FLOMAX) capsule 0.4 mg  0.4 mg Oral Daily PC Maria Fernanda Anton MD   0.4 mg at 09/13/23 1053   • rosuvastatin (CRESTOR) tablet 40 mg  40 mg Oral Daily Maria Fernanda Anton MD   40 mg at 09/13/23 0919   • polyethylene glycol (MIRALAX) packet 17 g  17 g Oral Daily Maria Fernanda Anton MD   17 g at 09/13/23 0920   • oxyCODONE (IMM REL) (ROXICODONE) tablet 10 mg  10 mg Oral Q4H PRN Maria Fernanda Anton MD   10 mg at 09/13/23 1056   • dextrose 50 % injection 25 g  25 g Intravenous PRN Maria Fernanda Anton MD       • dextrose 50 % injection 12.5 g  12.5 g Intravenous PRN Maria Fernanda Anton MD       • glucagon (GLUCAGEN) injection 1 mg  1 mg Intramuscular PRN Maria Fernanda Anton MD       • dextrose  (GLUTOSE) 40 % gel 15 g  15 g Oral PRN Maria Fernanda Anton MD       • dextrose (GLUTOSE) 40 % gel 30 g  30 g Oral PRN Maria Fernanda Anton MD       • insulin lispro (ADMELOG,HumaLOG) - Correction Dose   Subcutaneous TID WC Maria Fernanda Anton MD   1 Units at 09/13/23 1332   • naLOXone (NARCAN) injection 0.4 mg  0.4 mg Intravenous PRN Maria Fernanda Anton MD           Labs     Recent Labs   Lab 09/13/23  0627   WBC 9.9   RBC 3.41*   HGB 9.8*   HCT 29.4*        Recent Labs   Lab 09/13/23  0627 09/12/23  0556 09/11/23  0448   SODIUM 135 135 135   POTASSIUM 4.1 4.0 4.1   CHLORIDE 102 103 103   CO2 21 22 24   BUN 19 22* 30*   CREATININE 1.01 1.13 1.28*   CALCIUM 8.5 8.4 8.5   ALBUMIN 2.0* 2.1* 2.1*   BILIRUBIN 0.4 0.3 0.4   ALKPT 201* 166* 153*   GPT 53 53 57   AST 25 27 25   GLUCOSE 109* 107* 107*     Recent Labs   Lab 09/10/23  1309   PTT 38*   PT 10.9   INR 1.0            Imaging      US VASC EXTREMITY LOWER VENOUS DUPLEX   Final Result by Romero Wiley MD (09/11 1319)   FINDINGS/IMPRESSION:    1.   Study is positive for nonocclusive DVT in the right distal femoral   vein and popliteal vein.   2.   No additional site of DVT detected allowing for exam limitations   (groin wound, patient pain).   3.   This represents a substantial improvement from 8/17/2023, when there   was occlusive DVT throughout the common femoral and femoral veins.      Electronically Signed by: ROMERO WILEY M.D.    Signed on: 9/11/2023 1:19 PM    Workstation ID: [id number]      XR CHEST PA OR AP 1 VIEW   Final Result by Diandra Mcgregor MD (09/10 1440)   FINDINGS/IMPRESSION:      Right-sided Port-A-Cath with tip at superior cavoatrial junction relatively   unchanged.      Normal heart size. No mediastinal widening.      Scattered nodular densities right mid and upper lung field including 3.7 x   3.9 cm masslike opacity right upper lobe. Malignancy is the primary   consideration. Correlate with biopsy results.      Hazy increased density lateral aspect left  upper and mid lung field new   compared to 9/7/2023 of indeterminate etiology. Overlapping soft tissue   related to more semiupright positioning and patient rotation is probably   the more likely etiology. Underlying airspace opacities considered less   likely but not excluded from the differential diagnosis. Follow-up chest   x-ray recommended for further evaluation.      Electronically Signed by: ROSA MARIA MCKEON M.D.    Signed on: 9/10/2023 2:40 PM    Workstation ID: [id number]            Assessment & Plan             64 yo M with hx of urothelial bladder cancer 2021, squamous cell carcinoma of the anus 2022, hypertension, hyperlipidemia, diabetes, HIV, chronic right leg edema, presenting with difficulty urinating.     #Acute Urinary Retention  #Acute UTI  #Postobstructive MELODY  -Presents with recurrent urinary retention, abdominal pain, one week after removal of a phelan catheter  -Phelan replaced with large amount of urine output  -Most recent CT abdomen 8/19 notes a right lower pelvic mass and right lateral bladder wall thickening concerning for metastatic disease  -UA suggestive of UTI - s/p rocephine, cefepime.  -now on cipro. D/w pharmacy, cx- with psar and enterococcus, susceptible to cipro. Plan for 7 day course  -Urology consult. Cont phelan. Will dc with phelan. Consider inc flomax to 0.8mg nightly  -monitor renal ind closely, Cr downtrending 1.4--1.2-1.1     #Right Leg DVT  #Chronic Right Leg Edema  -dx acute dvt 4/2023  -repeat RLE venous duplex notes improved DVT, right distal fem/pop nonocclusive DVT persists.  -Currently on therapeutic lovenox  -per heme/onc, can consider oral AC upon dc (pending no further procedures). Will check pricing for eliquis/xarelto     #Concern for Metastatic Disease  #Hx of Squamous Cell Cancer of the Anus  chemoradiation 10/24/2022-12/9/2022   #Hx of Urothelial Bladder Carcinoma, s/p intravesicular chemo  #pulm mass  -Most recent CT scans note abnormal masses  -CTA  chest 8/17 - increased RUL subpleural pulmonary mass and hilar/mediastinal lymphadenopathy  -CT abdomen/Pelvis 8/19 - abnormal soft tissue thickening along right lower pelvic/abdominal peritoneum up to 2.3x3.4 cm and soft tissue thickening along right lateral bladder wall  -per chart underwent US guided lung bx of right mass 9/7/23. Path pending  -Oncology consult. Discuss repeat imaging (CT CAP) w/ service  -palliative cx to help w/ pain mgmt. Multimodal pain regimen - schedule tylenol. Start gabapentin increased to 400 q8 hours.. Inc dilaudid to 0.5mg Q4H PRN. Cont PRN oxy, scheduled flexeril. Cont home cymbalta.  -likely home tomorrow if pain controlled.      #Diabetes  -Hold oral medications in hospital (metformin, farxiga)  -Sliding scale, accuchecks  -BSS controlled.     #Hyperlipidemia  -Resume statin     #Hypertension  -cont home metop and amlodipine. BP adequately controlled  -cont to hold lisinopril, hydrochlorothiazide for now     #HIV  -cont HAART     #Peripheral Artery Disease  -Plavix and aspirin on hold pending possible procedures      PT/OT recs 3-5x/week. [facility] ordered  DVT Prophylaxis: lovenox sq  Code Status: Full Code  Primary Care Physician- John Paul Cardona MD    Teaching Addendum   I have seen and evaluated the patient. I discussed with resident and agree with findings and plan as documented with the following additions and clarifications.      Lupe Dill D.O, AM Hospitalist    Gen: Awake, Alert, WD, WN, NAD  Head:  NC/AT  Eyes:  PERRL, EOMI, Conjunctiva pink, lids normal, no scleral icterus  ENT: OP clear, moist mucus membranes  Neck: supple, nontender, trachea midline  Cardiac/CV:  S1 S2, RRR, no M/G/R  Respiratory/Pulm:  CTAB, good air movement, normal resp effort  Gastrointestinal/Abdomen:  Soft, nontender, nondistended  Pelvis: stable, nontender, Hips: FROM, nontender  Back:  no CVAT, no MLT  Ext:  warm, well perfused, moving all extremities spontaneously, no cyanosis, no erythema, no edema, femoral pulses intact, dorsal pedis pulses intact, negative Homans b/l, +mild ttp right calf., thigh non tender, knee non tender, FROM, no edema  Skin: intact, no rash, no vesicles, no petechiae, no ecchymosis  Neuro:  AAOx3, sensation intact, motor 5/5 x 4 extremities, normal gait, speech clear Gen: Awake, Alert, WD, WN, NAD  Head:  NC/AT  Eyes:  PERRL, EOMI, Conjunctiva pink, lids normal, no scleral icterus  ENT: OP clear, moist mucus membranes  Neck: supple, nontender, trachea midline  Cardiac/CV:  S1 S2, RRR, no M/G/R  Respiratory/Pulm:  CTAB, good air movement, normal resp effort  Gastrointestinal/Abdomen:  Soft, nontender, nondistended  Pelvis: stable, nontender, Hips: FROM, nontender  Back:  no CVAT, no MLT  Ext:  warm, well perfused, moving all extremities spontaneously, no cyanosis, no erythema, no edema, femoral pulses intact, dorsal pedis pulses intact, negative Homans b/l, +mild ttp right calf., thigh non tender, knee non tender, FROM, no edema, +SLR @ 35 degress on right LE  Skin: intact, no rash, no vesicles, no petechiae, no ecchymosis  Neuro:  AAOx3, sensation intact, motor 5/5 x 4 extremities, normal gait, speech clear

## 2024-11-12 NOTE — ED PROVIDER NOTE - PATIENT PORTAL LINK FT
You can access the FollowMyHealth Patient Portal offered by [facility] by registering at the following website: [email or web address]. By joining [facility]’s FollowMyHealth portal, you will also be able to view your health information using other applications (apps) compatible with our system.

## 2024-11-12 NOTE — ED ADULT NURSE NOTE - NSFALLUNIVINTERV_ED_ALL_ED
Bed/Stretcher in lowest position, wheels locked, appropriate side rails in place/Call bell, personal items and telephone in reach/Instruct patient to call for assistance before getting out of bed/chair/stretcher/Non-slip footwear applied when patient is off stretcher/[place] to call system/Physically safe environment - no spills, clutter or unnecessary equipment/Purposeful proactive rounding/Room/bathroom lighting operational, light cord in reach

## 2024-11-12 NOTE — ED ADULT TRIAGE NOTE - CHIEF COMPLAINT QUOTE
I have pain in my right leg, from the mid thigh down.  I have had this for a while, and I have been seen for this here already.  I have followed with a vascular MD and had a sono done.  all it showed was some varicosities.  I have no redness.  I have minimal swelling.  I find it hard to stand / walk.  no history of injury.  I took a motrin 800mg last night w/no relief.  I have no back pain.  No urinary or bowel issues.  the pain is always present when I am up walking, it is very minimal when I am at rest.

## 2024-11-12 NOTE — ED PROVIDER NOTE - NEURO NEGATIVE STATEMENT, MLM
Medically Supervised Weight Loss Documentation    Date of Visit: 09/30/2024    Patient: Tina Case    Beginning Weight: 251    Last Weight: 226    Current Weight: 231  Current BMI: Body mass index is 40.92 kg/m².  Weight Change: -20 pounds          Vitals:   Vitals:    09/30/24 1001   BP: 107/73   Pulse: 76   Resp: 16   Temp: 97.6 °F (36.4 °C)       Comorbidities:   Past Medical History:   Diagnosis Date    Allergy     Anxiety     Bipolar disorder     Schizophrenia     Sleep apnea, unspecified        Medications:  Current Outpatient Medications on File Prior to Visit   Medication Sig Dispense Refill    busPIRone (BUSPAR) 15 MG tablet Take 15 mg by mouth 2 (two) times daily.      cetirizine (ZYRTEC) 10 MG tablet Take 1 tablet (10 mg total) by mouth once daily. 30 tablet 2    dextroamphetamine-amphetamine (ADDERALL) 20 mg tablet Take 1 tablet by mouth once daily.      drospirenone-ethinyl estradioL (MICHELLE, 28,) 3-0.03 mg per tablet Take 1 tablet by mouth once daily. 90 tablet 4    escitalopram oxalate (LEXAPRO) 20 MG tablet Take 1 tablet (20 mg total) by mouth every evening.  0    hydrOXYzine pamoate (VISTARIL) 50 MG Cap Take 50 mg by mouth every evening.      metFORMIN (GLUCOPHAGE) 1000 MG tablet TAKE 1 TABLET (1,000 MG TOTAL) BY MOUTH 2 (TWO) TIMES DAILY WITH MEALS. 60 tablet 0    spironolactone (ALDACTONE) 50 MG tablet Take 1 tablet (50 mg total) by mouth once daily. 30 tablet 11     No current facility-administered medications on file prior to visit.         Body comp:  Fat Percent:  47.4 %  Fat Mass:  107.2 lb  FFM:  119 lb  TBW: 87.2 lb  TBW %:  38.5 %  BMR: 1719 kcal      Diet Education Discussed:  Stress at home- using food to cope, skipping few days  Breakfast:  yogurt, carrot sticks, piece of toast  Lunch:  meat/vegetables  Dinner:  fish/chicken/beef/vegetables, bell pepper, greens beans, carrots  Sparkling/Flavored water  No energy drinks/soda    Exercise/Activity Discussed:  Riding bicycle 12  petty, goal 15 miles    MVI/ Herbal:  Fat burner (OTC)  Daily MVI  B12        Behavior or Diet Goals for this patient:  Tanita Scale reviewed:  Increased 7 lb fat  Decreased 3 lb muscle  Decreased 2 lb water  Medication Discussion  Stop taking OTC fat burner   With Hx of Binge eating- possibly changing Adderall to Vyvance  Continue low carb dieting- be consistent  Continue exercising as much as possible  Continue Therapy weekly    Hold off on surgery for now  Labs  EKG  UGI -  Normal double-contrast upper GI study.  dietary consult- done  psych consult - dx with schizophrenia has shown stability for months- will need new     I will obtain the following clearances prior to surgery: none    Plan for Medical Weight Loss  Start Topiramate 25 mg BID- discussed side effects    : total time spent for visit: 23 minutes     no loss of consciousness, no gait abnormality, no headache, no sensory deficits, and no weakness.

## 2024-11-12 NOTE — ED PROVIDER NOTE - PROGRESS NOTE DETAILS
d/w pt and wife at bedside, plan to rule out DVT, need for follow up with vascular service, orthopedics. both agree with plan. pt provided copy of lab and US results. discussed results and next steps for outpatient follow up. pt agrees with plan

## 2024-11-12 NOTE — ED PROVIDER NOTE - CARE PROVIDER_API CALL
Kandace Stinson  Vascular Surgery  [address], [address]  [place] [address]  Phone: [phone]  Fax: [phone]  Follow Up Time: 1-3 Days   Kandace Stinson  Vascular Surgery  [address], [address]  [place] [address]  Phone: [phone]  Fax: [phone]  Follow Up Time: 1-3 Days    Aby Langley  Internal Medicine  [address] [place] [address]  Phone: [phone]  Fax: [phone]  Established Patient  Follow Up Time: 1-3 Days   Kandace Stinson  Vascular Surgery  [address], [address]  [place] [address]  Phone: [phone]  Fax: [phone]  Follow Up Time: 1-3 Days    Aby Langley  Internal Medicine  [address] [place] [address]  Phone: [phone]  Fax: [phone]  Established Patient  Follow Up Time: 1-3 Days    Ney Mccauley  Orthopaedic Surgery  [address], [address]  [place] [address]  Phone: [phone]  Fax: [phone]  Follow Up Time: 1-3 Days

## 2024-11-13 ENCOUNTER — APPOINTMENT (OUTPATIENT)
Dept: ORTHOPEDIC SURGERY | Facility: CLINIC | Age: 64
End: 2024-11-13
Payer: COMMERCIAL

## 2024-11-13 VITALS — BODY MASS INDEX: 25.16 KG/M2 | HEIGHT: 63 IN | WEIGHT: 142 LBS

## 2024-11-13 DIAGNOSIS — M51.26 OTHER INTERVERTEBRAL DISC DISPLACEMENT, LUMBAR REGION: ICD-10-CM

## 2024-11-13 DIAGNOSIS — M79.18 MYALGIA, OTHER SITE: ICD-10-CM

## 2024-11-13 DIAGNOSIS — M54.16 RADICULOPATHY, LUMBAR REGION: ICD-10-CM

## 2024-11-13 DIAGNOSIS — Z78.9 OTHER SPECIFIED HEALTH STATUS: ICD-10-CM

## 2024-11-13 DIAGNOSIS — Z86.39 PERSONAL HISTORY OF OTHER ENDOCRINE, NUTRITIONAL AND METABOLIC DISEASE: ICD-10-CM

## 2024-11-13 PROCEDURE — 72100 X-RAY EXAM L-S SPINE 2/3 VWS: CPT

## 2024-11-13 PROCEDURE — 72170 X-RAY EXAM OF PELVIS: CPT

## 2024-11-13 PROCEDURE — 99204 OFFICE O/P NEW MOD 45 MIN: CPT

## 2024-11-13 RX ORDER — CYCLOBENZAPRINE HYDROCHLORIDE 10 MG/1
10 TABLET, FILM COATED ORAL
Qty: 30 | Refills: 0 | Status: ACTIVE | COMMUNITY
Start: 2024-11-13 | End: 1900-01-01

## 2024-11-13 RX ORDER — MELOXICAM 15 MG/1
15 TABLET ORAL
Qty: 21 | Refills: 0 | Status: ACTIVE | COMMUNITY
Start: 2024-11-13 | End: 1900-01-01

## 2024-11-13 RX ORDER — ATORVASTATIN CALCIUM 80 MG/1
TABLET, FILM COATED ORAL
Refills: 0 | Status: ACTIVE | COMMUNITY

## 2024-11-18 ENCOUNTER — APPOINTMENT (OUTPATIENT)
Dept: VASCULAR SURGERY | Facility: CLINIC | Age: 64
End: 2024-11-18
Payer: COMMERCIAL

## 2024-11-18 VITALS
BODY MASS INDEX: 25.16 KG/M2 | WEIGHT: 142 LBS | HEIGHT: 63 IN | TEMPERATURE: 98 F | DIASTOLIC BLOOD PRESSURE: 93 MMHG | HEART RATE: 60 BPM | SYSTOLIC BLOOD PRESSURE: 166 MMHG

## 2024-11-18 DIAGNOSIS — I83.893 VARICOSE VEINS OF BILATERAL LOWER EXTREMITIES WITH OTHER COMPLICATIONS: ICD-10-CM

## 2024-11-18 DIAGNOSIS — M79.2 NEURALGIA AND NEURITIS, UNSPECIFIED: ICD-10-CM

## 2024-11-18 DIAGNOSIS — I77.1 STRICTURE OF ARTERY: ICD-10-CM

## 2024-11-18 DIAGNOSIS — I73.9 PERIPHERAL VASCULAR DISEASE, UNSPECIFIED: ICD-10-CM

## 2024-11-18 PROCEDURE — 99204 OFFICE O/P NEW MOD 45 MIN: CPT

## 2024-11-18 PROCEDURE — 93923 UPR/LXTR ART STDY 3+ LVLS: CPT

## 2024-11-18 RX ORDER — CILOSTAZOL 50 MG/1
50 TABLET ORAL
Qty: 180 | Refills: 3 | Status: ACTIVE | COMMUNITY
Start: 2024-11-18 | End: 1900-01-01

## 2024-11-20 ENCOUNTER — APPOINTMENT (OUTPATIENT)
Dept: MRI IMAGING | Facility: CLINIC | Age: 64
End: 2024-11-20

## 2024-11-20 PROCEDURE — 72148 MRI LUMBAR SPINE W/O DYE: CPT

## 2024-11-26 ENCOUNTER — TRANSCRIPTION ENCOUNTER (OUTPATIENT)
Age: 64
End: 2024-11-26

## 2024-11-27 ENCOUNTER — APPOINTMENT (OUTPATIENT)
Dept: ORTHOPEDIC SURGERY | Facility: CLINIC | Age: 64
End: 2024-11-27
Payer: COMMERCIAL

## 2024-11-27 DIAGNOSIS — M54.16 RADICULOPATHY, LUMBAR REGION: ICD-10-CM

## 2024-11-27 DIAGNOSIS — M79.18 MYALGIA, OTHER SITE: ICD-10-CM

## 2024-11-27 DIAGNOSIS — M25.561 PAIN IN RIGHT KNEE: ICD-10-CM

## 2024-11-27 PROCEDURE — 99214 OFFICE O/P EST MOD 30 MIN: CPT

## 2024-11-27 RX ORDER — GABAPENTIN 300 MG/1
300 CAPSULE ORAL
Qty: 60 | Refills: 0 | Status: ACTIVE | COMMUNITY
Start: 2024-11-27 | End: 1900-01-01

## 2024-11-30 ENCOUNTER — APPOINTMENT (OUTPATIENT)
Dept: MRI IMAGING | Facility: CLINIC | Age: 64
End: 2024-11-30
Payer: COMMERCIAL

## 2024-11-30 PROCEDURE — 73721 MRI JNT OF LWR EXTRE W/O DYE: CPT | Mod: RT

## 2024-12-02 ENCOUNTER — RX RENEWAL (OUTPATIENT)
Age: 64
End: 2024-12-02

## 2024-12-04 ENCOUNTER — APPOINTMENT (OUTPATIENT)
Dept: PAIN MANAGEMENT | Facility: CLINIC | Age: 64
End: 2024-12-04

## 2024-12-30 ENCOUNTER — RX RENEWAL (OUTPATIENT)
Age: 64
End: 2024-12-30

## 2025-01-22 ENCOUNTER — APPOINTMENT (OUTPATIENT)
Dept: ORTHOPEDIC SURGERY | Facility: CLINIC | Age: 65
End: 2025-01-22
Payer: COMMERCIAL

## 2025-01-22 VITALS — WEIGHT: 142 LBS | HEIGHT: 63 IN | BODY MASS INDEX: 25.16 KG/M2

## 2025-01-22 DIAGNOSIS — M25.561 PAIN IN RIGHT KNEE: ICD-10-CM

## 2025-01-22 DIAGNOSIS — M54.16 RADICULOPATHY, LUMBAR REGION: ICD-10-CM

## 2025-01-22 PROCEDURE — 99214 OFFICE O/P EST MOD 30 MIN: CPT

## 2025-01-27 ENCOUNTER — APPOINTMENT (OUTPATIENT)
Dept: VASCULAR SURGERY | Facility: CLINIC | Age: 65
End: 2025-01-27

## 2025-01-29 ENCOUNTER — APPOINTMENT (OUTPATIENT)
Dept: CARDIOLOGY | Facility: CLINIC | Age: 65
End: 2025-01-29
Payer: COMMERCIAL

## 2025-01-29 ENCOUNTER — NON-APPOINTMENT (OUTPATIENT)
Age: 65
End: 2025-01-29

## 2025-01-29 VITALS
WEIGHT: 155 LBS | HEART RATE: 55 BPM | SYSTOLIC BLOOD PRESSURE: 144 MMHG | BODY MASS INDEX: 27.46 KG/M2 | DIASTOLIC BLOOD PRESSURE: 82 MMHG | HEIGHT: 63 IN | OXYGEN SATURATION: 100 %

## 2025-01-29 DIAGNOSIS — I10 ESSENTIAL (PRIMARY) HYPERTENSION: ICD-10-CM

## 2025-01-29 DIAGNOSIS — E78.5 HYPERLIPIDEMIA, UNSPECIFIED: ICD-10-CM

## 2025-01-29 PROCEDURE — 93000 ELECTROCARDIOGRAM COMPLETE: CPT

## 2025-01-29 PROCEDURE — 99214 OFFICE O/P EST MOD 30 MIN: CPT

## 2025-01-29 PROCEDURE — G2211 COMPLEX E/M VISIT ADD ON: CPT

## 2025-02-05 ENCOUNTER — EMERGENCY (EMERGENCY)
Facility: HOSPITAL | Age: 65
LOS: 1 days | Discharge: HOME | End: 2025-02-05
Attending: STUDENT IN AN ORGANIZED HEALTH CARE EDUCATION/TRAINING PROGRAM | Admitting: STUDENT IN AN ORGANIZED HEALTH CARE EDUCATION/TRAINING PROGRAM
Payer: COMMERCIAL

## 2025-02-05 VITALS
DIASTOLIC BLOOD PRESSURE: 83 MMHG | RESPIRATION RATE: 20 BRPM | HEART RATE: 79 BPM | SYSTOLIC BLOOD PRESSURE: 137 MMHG | OXYGEN SATURATION: 99 % | TEMPERATURE: 98 F

## 2025-02-05 VITALS
HEART RATE: 65 BPM | OXYGEN SATURATION: 98 % | WEIGHT: 154.98 LBS | DIASTOLIC BLOOD PRESSURE: 62 MMHG | RESPIRATION RATE: 16 BRPM | HEIGHT: 63 IN | TEMPERATURE: 97 F | SYSTOLIC BLOOD PRESSURE: 122 MMHG

## 2025-02-05 DIAGNOSIS — Z98.890 OTHER SPECIFIED POSTPROCEDURAL STATES: Chronic | ICD-10-CM

## 2025-02-05 LAB
ALBUMIN SERPL ELPH-MCNC: 3.3 G/DL — SIGNIFICANT CHANGE UP (ref 3.3–5)
ALP SERPL-CCNC: 36 U/L — LOW (ref 40–120)
ALT FLD-CCNC: 37 U/L — SIGNIFICANT CHANGE UP (ref 12–78)
ANION GAP SERPL CALC-SCNC: 5 MMOL/L — SIGNIFICANT CHANGE UP (ref 5–17)
AST SERPL-CCNC: 26 U/L — SIGNIFICANT CHANGE UP (ref 15–37)
BASOPHILS # BLD AUTO: 0.04 K/UL — SIGNIFICANT CHANGE UP (ref 0–0.2)
BASOPHILS NFR BLD AUTO: 0.7 % — SIGNIFICANT CHANGE UP (ref 0–2)
BILIRUB SERPL-MCNC: 0.5 MG/DL — SIGNIFICANT CHANGE UP (ref 0.2–1.2)
BUN SERPL-MCNC: 12 MG/DL — SIGNIFICANT CHANGE UP (ref 7–23)
CALCIUM SERPL-MCNC: 8 MG/DL — LOW (ref 8.5–10.1)
CHLORIDE SERPL-SCNC: 112 MMOL/L — HIGH (ref 96–108)
CK MB BLD-MCNC: 0.6 % — SIGNIFICANT CHANGE UP (ref 0–3.5)
CK MB CFR SERPL CALC: 1.7 NG/ML — SIGNIFICANT CHANGE UP (ref 0–3.6)
CK SERPL-CCNC: 306 U/L — SIGNIFICANT CHANGE UP (ref 26–308)
CO2 SERPL-SCNC: 23 MMOL/L — SIGNIFICANT CHANGE UP (ref 22–31)
CREAT SERPL-MCNC: 1.2 MG/DL — SIGNIFICANT CHANGE UP (ref 0.5–1.3)
D DIMER BLD IA.RAPID-MCNC: <150 NG/ML DDU — SIGNIFICANT CHANGE UP
EGFR: 68 ML/MIN/1.73M2 — SIGNIFICANT CHANGE UP
EOSINOPHIL # BLD AUTO: 0.24 K/UL — SIGNIFICANT CHANGE UP (ref 0–0.5)
EOSINOPHIL NFR BLD AUTO: 4 % — SIGNIFICANT CHANGE UP (ref 0–6)
GLUCOSE SERPL-MCNC: 142 MG/DL — HIGH (ref 70–99)
HCT VFR BLD CALC: 38 % — LOW (ref 39–50)
HGB BLD-MCNC: 13 G/DL — SIGNIFICANT CHANGE UP (ref 13–17)
IMM GRANULOCYTES NFR BLD AUTO: 0.3 % — SIGNIFICANT CHANGE UP (ref 0–0.9)
LIDOCAIN IGE QN: 66 U/L — SIGNIFICANT CHANGE UP (ref 13–75)
LYMPHOCYTES # BLD AUTO: 1.45 K/UL — SIGNIFICANT CHANGE UP (ref 1–3.3)
LYMPHOCYTES # BLD AUTO: 24 % — SIGNIFICANT CHANGE UP (ref 13–44)
MAGNESIUM SERPL-MCNC: 2.1 MG/DL — SIGNIFICANT CHANGE UP (ref 1.6–2.6)
MCHC RBC-ENTMCNC: 32.3 PG — SIGNIFICANT CHANGE UP (ref 27–34)
MCHC RBC-ENTMCNC: 34.2 G/DL — SIGNIFICANT CHANGE UP (ref 32–36)
MCV RBC AUTO: 94.3 FL — SIGNIFICANT CHANGE UP (ref 80–100)
MONOCYTES # BLD AUTO: 0.53 K/UL — SIGNIFICANT CHANGE UP (ref 0–0.9)
MONOCYTES NFR BLD AUTO: 8.8 % — SIGNIFICANT CHANGE UP (ref 2–14)
NEUTROPHILS # BLD AUTO: 3.76 K/UL — SIGNIFICANT CHANGE UP (ref 1.8–7.4)
NEUTROPHILS NFR BLD AUTO: 62.2 % — SIGNIFICANT CHANGE UP (ref 43–77)
NRBC # BLD: 0 /100 WBCS — SIGNIFICANT CHANGE UP (ref 0–0)
NRBC BLD-RTO: 0 /100 WBCS — SIGNIFICANT CHANGE UP (ref 0–0)
PLATELET # BLD AUTO: 235 K/UL — SIGNIFICANT CHANGE UP (ref 150–400)
POTASSIUM SERPL-MCNC: 4.6 MMOL/L — SIGNIFICANT CHANGE UP (ref 3.5–5.3)
POTASSIUM SERPL-SCNC: 4.6 MMOL/L — SIGNIFICANT CHANGE UP (ref 3.5–5.3)
PROT SERPL-MCNC: 6.1 G/DL — SIGNIFICANT CHANGE UP (ref 6–8.3)
RBC # BLD: 4.03 M/UL — LOW (ref 4.2–5.8)
RBC # FLD: 12.5 % — SIGNIFICANT CHANGE UP (ref 10.3–14.5)
SODIUM SERPL-SCNC: 140 MMOL/L — SIGNIFICANT CHANGE UP (ref 135–145)
TROPONIN I, HIGH SENSITIVITY RESULT: 6.8 NG/L — SIGNIFICANT CHANGE UP
TROPONIN I, HIGH SENSITIVITY RESULT: 7 NG/L — SIGNIFICANT CHANGE UP
WBC # BLD: 6.04 K/UL — SIGNIFICANT CHANGE UP (ref 3.8–10.5)
WBC # FLD AUTO: 6.04 K/UL — SIGNIFICANT CHANGE UP (ref 3.8–10.5)

## 2025-02-05 PROCEDURE — 93010 ELECTROCARDIOGRAM REPORT: CPT

## 2025-02-05 PROCEDURE — 71250 CT THORAX DX C-: CPT | Mod: 26

## 2025-02-05 PROCEDURE — 71045 X-RAY EXAM CHEST 1 VIEW: CPT

## 2025-02-05 PROCEDURE — 85025 COMPLETE CBC W/AUTO DIFF WBC: CPT

## 2025-02-05 PROCEDURE — 71045 X-RAY EXAM CHEST 1 VIEW: CPT | Mod: 26

## 2025-02-05 PROCEDURE — 99284 EMERGENCY DEPT VISIT MOD MDM: CPT

## 2025-02-05 PROCEDURE — 83735 ASSAY OF MAGNESIUM: CPT

## 2025-02-05 PROCEDURE — 96374 THER/PROPH/DIAG INJ IV PUSH: CPT

## 2025-02-05 PROCEDURE — 82962 GLUCOSE BLOOD TEST: CPT

## 2025-02-05 PROCEDURE — 71250 CT THORAX DX C-: CPT | Mod: MC

## 2025-02-05 PROCEDURE — 84484 ASSAY OF TROPONIN QUANT: CPT

## 2025-02-05 PROCEDURE — 82550 ASSAY OF CK (CPK): CPT

## 2025-02-05 PROCEDURE — 85379 FIBRIN DEGRADATION QUANT: CPT

## 2025-02-05 PROCEDURE — 99285 EMERGENCY DEPT VISIT HI MDM: CPT

## 2025-02-05 PROCEDURE — 93005 ELECTROCARDIOGRAM TRACING: CPT

## 2025-02-05 PROCEDURE — 80053 COMPREHEN METABOLIC PANEL: CPT

## 2025-02-05 PROCEDURE — 70450 CT HEAD/BRAIN W/O DYE: CPT | Mod: 26

## 2025-02-05 PROCEDURE — 82553 CREATINE MB FRACTION: CPT

## 2025-02-05 PROCEDURE — 36415 COLL VENOUS BLD VENIPUNCTURE: CPT

## 2025-02-05 PROCEDURE — 70450 CT HEAD/BRAIN W/O DYE: CPT | Mod: MC

## 2025-02-05 PROCEDURE — 99285 EMERGENCY DEPT VISIT HI MDM: CPT | Mod: 25

## 2025-02-05 PROCEDURE — 83690 ASSAY OF LIPASE: CPT

## 2025-02-05 RX ORDER — BACTERIOSTATIC SODIUM CHLORIDE 0.9 %
1000 VIAL (ML) INJECTION ONCE
Refills: 0 | Status: COMPLETED | OUTPATIENT
Start: 2025-02-05 | End: 2025-02-05

## 2025-02-05 RX ORDER — OLMESARTAN MEDOXOMIL 20 MG/1
1 TABLET, FILM COATED ORAL
Refills: 0 | DISCHARGE

## 2025-02-05 RX ORDER — ACETAMINOPHEN 160 MG/5ML
1000 SUSPENSION ORAL ONCE
Refills: 0 | Status: COMPLETED | OUTPATIENT
Start: 2025-02-05 | End: 2025-02-05

## 2025-02-05 RX ORDER — ATORVASTATIN CALCIUM 80 MG/1
1 TABLET, FILM COATED ORAL
Refills: 0 | DISCHARGE

## 2025-02-05 RX ADMIN — ACETAMINOPHEN 400 MILLIGRAM(S): 160 SUSPENSION ORAL at 03:37

## 2025-02-05 RX ADMIN — Medication 1000 MILLILITER(S): at 03:37

## 2025-02-05 NOTE — ED ADULT TRIAGE NOTE - CHIEF COMPLAINT QUOTE
31-Jan-2024 07:12
Patient brought by ambulance from home as reported was preparing for colonoscopy then passed out at home received by IV fluids to Right AC 20 g

## 2025-02-05 NOTE — ED ADULT NURSE REASSESSMENT NOTE - NS ED NURSE REASSESS COMMENT FT1
0730:  received patient.  he is awake, alert, denies pain.  spouse at bedside.  she states that the patient has a syncopal event and "was out".  Wife states "I started to do compressions on him, and he swatted me away".  "I was so scared.  I was going to make him drink the rest of the preparation for his colonoscopy but he refused".  vitals recorded.  dispo pending.  lena marin.
0815: patient is discharged.  iv removed, site benign. the wife is questioning Dr. Guillen order, to take BP medication with a sip of water. I confirmed that he should do this.  He is going for colonoscopy today at 1000 and is due to arrive at office at 0900.  the patient verbalized understanding.  He was given all discharge papers and left the er in stable condition, no distress.  he is no longer nauseas.  he is not lightheaded.  he states he feels improved.  vitals recorded.  if any worsening of symptoms should occur, then he should return to the er.  the patient / wife verbalized understanding,.  lena marin.
Pt complained of left sided non radiating chest pain that recently started. [place] 7/10. Dr. Mckinney was informed, Iv tyl ordered and administered, repeat trop performed. Pt tolerated procedure well.

## 2025-02-05 NOTE — ED ADULT NURSE NOTE - RESPIRATION RHYTHM, QM
Problem: Mobility Impaired (Adult and Pediatric) Goal: *Acute Goals and Plan of Care (Insert Text) Description: FUNCTIONAL STATUS PRIOR TO ADMISSION: Patient was modified independent using a rollator for functional mobility. HOME SUPPORT PRIOR TO ADMISSION: The patient lived alone with daughter to provide assistance. Will be staying with daughter once discharged. Physical Therapy Goals Revised 4/26/2021 1. Patient will move from supine to sit and sit to supine , scoot up and down, and roll side to side in bed with minimal assistance/contact guard assist within 7 day(s). 2.  Patient will transfer from bed to chair and chair to bed with minimal assistance/contact guard assist using the least restrictive device within 7 day(s). 3.  Patient will perform sit to stand with minimal assistance/contact guard assist within 7 day(s). 4.  Patient will sit at EOB with supervision for balance engaged in task for 5 minutes within 7 day(s). Physical Therapy Goals Initiated 4/17/2021 1. Patient will move from supine to sit and sit to supine  and scoot up and down in bed with modified independence within 7 day(s). 2.  Patient will transfer from bed to chair and chair to bed with modified independence using the least restrictive device within 7 day(s). 3.  Patient will perform sit to stand with modified independence within 7 day(s). 4.  Patient will ambulate with modified independence for 150 feet with the least restrictive device within 7 day(s). Outcome: Not Progressing Towards Goal 
 PHYSICAL THERAPY TREATMENT: WEEKLY REASSESSMENT Patient: Lisa Cheney (62 y.o. female) Date: 4/26/2021 Primary Diagnosis: Dyspnea [R06.00] Procedure(s) (LRB): 
RIGHT HEART CATH (N/A) 6 Days Post-Op Precautions: fall,  (LVAD) no chest compressions ASSESSMENT Patient continues with skilled PT services and is not progressing towards goals.  Today she presented with and was limited by significant tremors/jerks (whole body at rest and during volitional movement). She also presented with coordination impairment (also related to the tremors that during volitional movement looked like perhaps some ataxia as well). Her tremors were so pronounced and unpredictable that deemed her unsafe to attempt sitting at the EOB. She has been undergoing work up for the tremors. As she presented today she is no longer on track for discharge to home (nor to the home of her daughter). Will increase frequency to 5 times a week. As pt presents today she would not be safe to attempt to change over her LVAD from power module to battery power. Her UE motor control and coordination was so impaired that she would be at risk for self injury. She could perhaps dislodge the drive line from position. Also of note, when pt drank some water she promptly coughed. Her daughter was present and reported that she has noticed that her mom is now coughing when she drinks. Discussed with her nurse recommending a SLP consult. Patient's progression toward goals since last assessment: pt has lost ground with mobility Current Level of Function Impacting Discharge (mobility/balance): impaired sitting balance with back of bed for support, given tremors sitting balance not assessed at EOB Functional Outcome Measure: The patient scored 0/28 on the Tinetti outcome measure which is indicative of high fall risk. Krystal Salgado Other factors to consider for discharge: at baseline lives alone, previous LVAD implantation, new onset of whole body tremors during this admission PLAN : 
Goals have been updated based on progression since last assessment. Patient continues to benefit from skilled intervention to address the above impairments. Recommendations and Planned Interventions: bed mobility training, transfer training, gait training, therapeutic exercises, patient and family training/education, and therapeutic activities Frequency/Duration: Patient will be followed by physical therapy:  5 times a week to address goals. Recommendation for discharge: (in order for the patient to meet his/her long term goals) To be determined: as she presents today would have to recommend rehab, will continue to assess and make recommendations as the tremors are addressed This discharge recommendation: A follow-up discussion with the attending provider and/or case management is planned IF patient discharges home will need the following DME: to be determined (TBD) SUBJECTIVE:  
Patient stated I'll try.  OBJECTIVE DATA SUMMARY:  
Chart checked, pt cleared by nursing HISTORY:   
Past Medical History:  
Diagnosis Date  Asthma  Cancer ([facility] breast  
 Cancer ([facility]   
 endometrial  
 Congestive heart failure, unspecified  CRI (chronic renal insufficiency)  Depression  [facility]  Hypertension Past Surgical History:  
Procedure Laterality Date  HX HERNIA REPAIR    
 HX HYSTERECTOMY  HX MASTECTOMY Personal factors and/or comorbidities impacting plan of care: at baseline lives alone, previous LVAD implantation, new onset of whole body tremors during this admission Home Situation Home Environment: Private residence # Steps to Enter: 0 One/Two Story Residence: One story Living Alone: Yes Support Systems: Child(ivania), Home care staff Patient Expects to be Discharged to[de-identified] Private residence Current DME Used/Available at Home: Selestino Tobin, rollator Tub or Shower Type: (sponge bathing) EXAMINATION/PRESENTATION/DECISION MAKING:  
Critical Behavior: 
Neurologic State: Alert Orientation Level: Oriented X4(intermittent confusion) Cognition: Decreased attention/concentration, Follows commands Safety/Judgement: Awareness of environment Hearing: Auditory Auditory Impairment: None Skin:  refer to MD and nursing notes Edema: refer to MD and nursing notes Range Of Motion: 
AROM: Generally decreased, functional 
  
  
  
  
  
  
  
Strength:   
Strength: Generally decreased, functional(flutuating in nature) Tone & Sensation:  
  
  
  
  
  
Sensation: Impaired, gave some inconsistent responses to light touch on extremities Coordination: 
Coordination: Grossly decreased, non-functional 
Vision:  
  
Functional Mobility: 
Bed Mobility: 
Rolling: Stand-by assistance; Adaptive equipment(safety concerns) Scooting: Maximum assistance;Assist x2(in supine) Transfers: 
  
  
     
  
     
  
  
Balance:  
Sitting: Impaired; With support but not at EOB, deemed not safe today. Ambulation/Gait Training: 
  
  
  
  
  
  
  
  
  
  
  
  
  
  
  
  
  
  
 Stairs: Therapeutic Exercises:  
 
 
Functional Measure: 
Tinetti test: 
 
Sitting Balance: 0 Arises: 0 Attempts to Rise: 0 Immediate Standing Balance: 0 Standing Balance: 0 Nudged: 0 Eyes Closed: 0 Turn 360 Degrees - Continuous/Discontinuous: 0 Turn 360 Degrees - Steady/Unsteady: 0 Sitting Down: 0 Balance Score: 0 Balance total score Indication of Gait: 0 
R Step Length/Height: 0 
L Step Length/Height: 0 
R Foot Clearance: 0 
L Foot Clearance: 0 Step Symmetry: 0 Step Continuity: 0 Path: 0 Trunk: 0 Walking Time: 0 Gait Score: 0 Gait total score Total Score: 0/28 Overall total score Tinetti Tool Score Risk of Falls 
<19 = High Fall Risk 19-24 = Moderate Fall Risk 25-28 = Low Fall Risk Tinetti ME. Performance-Oriented Assessment of Mobility Problems in Elderly Patients. [address] 66; [id number]. (Scoring Description: PT Bulletin Feb. 10, 1993) Older adults: Tye Meckel et al, 2009; n = [address] elderly evaluated with ABC, FELIZ, ADL, and IADL) · Mean FELIZ score for males aged 69-68 years = 26.21(3.40) · Mean FELIZ score for females age 69-68 years = 25.16(4.30) · Mean FELIZ score for males over 80 years = 23.29(6.02) · Mean FELIZ score for females over 80 years = 17.20(8.32) Pain Rating: 
None rated Activity Tolerance:  
Fair After treatment patient left in no apparent distress:  
Supine in bed, Call bell within reach, Caregiver / family present, and Side rails x 3 
 
COMMUNICATION/EDUCATION:  
The patients plan of care was discussed with: Occupational therapist and Registered nurse. Fall prevention education was provided and the patient/caregiver indicated understanding., Patient/family have participated as able in goal setting and plan of care. , and Patient/family agree to work toward stated goals and plan of care. Thank you for this referral. 
Kateryna Sin Time Calculation: 22 mins regular

## 2025-02-05 NOTE — ED PROVIDER NOTE - CARE PROVIDERS DIRECT ADDRESSES
,DirectAddress_Unknown,DirectAddress_Unknown,[email or web address]
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
## 2025-02-05 NOTE — ED PROVIDER NOTE - DIFFERENTIAL DIAGNOSIS
Ddx includes but not limited to vasovagal syncope, hypotension, bradycardia, cardiac arrhythmia, ACS, MI, hypoglycemia, electrolyte imbalance Differential Diagnosis

## 2025-02-05 NOTE — ED PROVIDER NOTE - NEUROLOGICAL, MLM
Alert and oriented, no focal deficits, no motor or sensory deficits. Head NC/AT, full ROM of c-spine, no midline tenderness or deformity

## 2025-02-05 NOTE — CONSULT NOTE ADULT - ASSESSMENT
64-year-old male with a history of HTN, HLD, bradycardia, PRASAD, prediabetes presents with syncope.     #Syncope  - likely vasovagal given the H/O known SB and hypovolemia from bowel prep and vagal stimulation while defecation  - S/P 2L IVF in the ED  - HD stable  - orthostatics normal   - EKG: SB  - trop (-) x2  - also was seen in the OP by Dr. Escobedo a week ago with an unremarkable cardiac exam  - stable for a D/C home   - Spoke to Dr. Zamora, pt has completed bowel prep and can come to his office today for his sched colonoscopy     #Volume Status  - No clinical evidence of vol OL  - Echo from March 2022 with LVEF of 60%  - Will repeat TTE, follow up results       #Ischemia   - No clear evidence of acute ischemia  - Troponin negative   - EKG: SB  - no Hx of CAD  - Continue to monitor for signs or symptoms of ischemia at home     #Arrhythmia  - EKG: SB, unchanged from prior EKG  - Monitor and replete lytes, keep K>4, Mg>2.    #HTN  - BP stable currently  - Continue home meds: omlesartan  - Continue to monitor hemodynamics     - Other cardiovascular workup will depend on clinical course.  - All other workup per primary team.  - Will continue to follow.

## 2025-02-05 NOTE — ED PROVIDER NOTE - CARE PROVIDER_API CALL
Aby Langley  Internal Medicine  [address] [place] [address]  Phone: [phone]  Fax: [phone]  Follow Up Time:     Ari Guillen  Gastroenterology  [address] [place] [id number]  Phone: [phone]  Fax: [phone]  Follow Up Time:     Michel Vallejo  Cardiology  [address] [place] [address]  Phone: [phone]  Fax: [phone]  Follow Up Time:

## 2025-02-05 NOTE — ED PROVIDER NOTE - NSFOLLOWUPINSTRUCTIONS_ED_ALL_ED_FT
Please follow up with your GI doctor and primary care doctor.  Return to the ER for persistent weakness, fainting, chest pain, shortness of breath, dehydration, or any other concerns.     Syncope       Syncope refers to a condition in which a person temporarily loses consciousness. Syncope may also be called fainting or passing out. It is caused by a sudden decrease in blood flow to the brain. Even though most causes of syncope are not dangerous, syncope can be a sign of a serious medical problem. Your health care provider may do tests to find the reason why you are having syncope.    Signs that you may be about to faint include:    Feeling dizzy or light-headed.  Feeling nauseous.  Seeing all white or all black in your field of vision.  Having cold, clammy skin.    If you faint, get medical help right away. Call your local emergency services (911 in the [place] Do not drive yourself to the hospital.    Follow these instructions at home:  Pay attention to any changes in your symptoms. Take these actions to stay safe and to help relieve your symptoms:        Lifestyle    Do not drive, use machinery, or play sports until your health care provider says it is okay.  Do not drink alcohol.  Do not use any products that contain nicotine or tobacco, such as cigarettes and e-cigarettes. If you need help quitting, ask your health care provider.  Drink enough fluid to keep your urine pale yellow.        General instructions    Take over-the-counter and prescription medicines only as told by your health care provider.  If you are taking blood pressure or heart medicine, get up slowly and take several minutes to sit and then stand. This can reduce dizziness or light-headedness.  Have someone stay with you until you feel stable.  If you start to feel like you might faint, lie down right away and raise (elevate) your feet above the level of your heart. Breathe deeply and steadily. Wait until all the symptoms have passed.  Keep all follow-up visits as told by your health care provider. This is important.    Get help right away if you:  Have a severe headache.  Faint once or repeatedly.  Have pain in your chest, abdomen, or back.  Have a very fast or irregular heartbeat (palpitations).  Have pain when you breathe.  Are bleeding from your mouth or rectum, or you have black or tarry stool.  Have a seizure.  Are confused.  Have trouble walking.  Have severe weakness.  Have vision problems.    These symptoms may represent a serious problem that is an emergency. Do not wait to see if your symptoms will go away. Get medical help right away. Call your local emergency services (911 in the [place] Do not drive yourself to the hospital.    Summary  Syncope refers to a condition in which a person temporarily loses consciousness. It is caused by a sudden decrease in blood flow to the brain.  Signs that you may be about to faint include dizziness, feeling light-headed, feeling nauseous, sudden vision changes, or cold, clammy skin.  Although most causes of syncope are not dangerous, syncope can be a sign of a serious medical problem. If you faint, get medical help right away.    ADDITIONAL NOTES AND INSTRUCTIONS    Please follow up with your Primary MD in 24-48 hr.  Seek immediate medical care for any new/worsening signs or symptoms.

## 2025-02-05 NOTE — CONSULT NOTE ADULT - ATTENDING COMMENTS
64-year-old male with a history of HTN, HLD, bradycardia, PRASAD, prediabetes presents with syncope.     #Syncope  - likely vasovagal given the H/O known SB and hypovolemia from bowel prep and vagal stimulation while defecation  - S/P 2L IVF in the ED  - HD stable  - orthostatics normal   - EKG: SB  - trop (-) x2  - stable for a D/C home   - Spoke to Dr. Zamroa, pt has completed bowel prep and can come to his office today for his sched colonoscopy   - Continue home meds: omlesartan  - Optimized for the OR from a cardiac point of view with no evidence of active ischemic heart disease, decompensated heart failure, severe obstructive valvular disease, or uncontrolled arrhythmia.

## 2025-02-05 NOTE — ED ADULT NURSE NOTE - CHIEF COMPLAINT QUOTE
Patient brought by ambulance from home as reported was preparing for colonoscopy then passed out at home received by IV fluids to Right AC 20 g
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
## 2025-02-05 NOTE — ED ADULT NURSE REASSESSMENT NOTE - NSFALLUNIVINTERV_ED_ALL_ED
Bed/Stretcher in lowest position, wheels locked, appropriate side rails in place/Call bell, personal items and telephone in reach/Instruct patient to call for assistance before getting out of bed/chair/stretcher/Non-slip footwear applied when patient is off stretcher/[place] to call system/Physically safe environment - no spills, clutter or unnecessary equipment/Purposeful proactive rounding/Room/bathroom lighting operational, light cord in reach

## 2025-02-05 NOTE — ED PROVIDER NOTE - CARDIAC, MLM
Normal rate, regular rhythm.  Heart sounds S1, S2.  No murmurs, rubs or gallops. No ecchymosis noted to left chest

## 2025-02-05 NOTE — ED PROVIDER NOTE - PATIENT PORTAL LINK FT
You can access the FollowMyHealth Patient Portal offered by [facility] by registering at the following website: [email or web address]. By joining [employer]’s FollowMyHealth portal, you will also be able to view your health information using other applications (apps) compatible with our system.
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
## 2025-02-05 NOTE — ED PROVIDER NOTE - CLINICAL SUMMARY MEDICAL DECISION MAKING FREE TEXT BOX
64 year old male with a history of DM, HLD, bradycardia p/w syncope. Patient BIBA from home. He is scheduled for a colonoscopy this morning, started the bowel prep yesterday.  Wife heard loud noise in the bathroom, found patient lying on floor with LOC x 1 minute.  Noted to be hypotensive.  EMS administered fluids, patient now alert. No seizure-like activity.  Check labs, CE, d-dimer, CXR, EKG, electrolytes, hydrate, consult cardio and GI

## 2025-02-05 NOTE — ED PROVIDER NOTE - NSICDXPASTMEDICALHX_GEN_ALL_CORE_FT
PAST MEDICAL HISTORY:  Anxiety     Bradycardia     HTN (hypertension)     Hypercholesterolemia     PRASAD (obstructive sleep apnea) On CPAP    Prediabetes     Sebaceous cyst

## 2025-02-05 NOTE — ED ADULT NURSE NOTE - OBJECTIVE STATEMENT
Patient stated he is scheduled for colonoscopy, was drinking prep all day, went to use rest room at home and passed out. Wife stated she witnessed event. Pt hit his head "mildly on baseboard" Denies any cardiac history, denies SOB, denies chest pain

## 2025-02-11 ENCOUNTER — APPOINTMENT (OUTPATIENT)
Dept: ORTHOPEDIC SURGERY | Facility: CLINIC | Age: 65
End: 2025-02-11

## 2025-04-30 NOTE — ED ADULT NURSE NOTE - NSSISCREENINGQ3_ED_A_ED
Patient was provided instructions regarding radiation safety to follow for 7 days after their therapy was administered.      During this time:  Bodily fluids should be handled with standard hospital precautions, using good hand hygiene, gloves, and gowns.    Limit time with patient and keeping a 3 - 6 foot distance when possible.    Please continue to provide care to patient as required.      For questions during normal business hours contact the Nuclear Medicine department:  [facility] Main Sorting [phone]    For questions at other times, ask the hospital  at facility shown above to page the on-call Nuclear Medicine technologist.     Applicable: 04/30/25 - 05/07/2025    
No

## 2025-05-27 ENCOUNTER — APPOINTMENT (OUTPATIENT)
Dept: ORTHOPEDIC SURGERY | Facility: CLINIC | Age: 65
End: 2025-05-27
Payer: COMMERCIAL

## 2025-05-27 DIAGNOSIS — M23.91 UNSPECIFIED INTERNAL DERANGEMENT OF RIGHT KNEE: ICD-10-CM

## 2025-05-27 PROCEDURE — 73562 X-RAY EXAM OF KNEE 3: CPT | Mod: RT

## 2025-05-27 PROCEDURE — 99203 OFFICE O/P NEW LOW 30 MIN: CPT

## 2025-07-17 ENCOUNTER — APPOINTMENT (OUTPATIENT)
Dept: CARDIOLOGY | Facility: CLINIC | Age: 65
End: 2025-07-17
Payer: COMMERCIAL

## 2025-07-17 VITALS
HEIGHT: 63 IN | OXYGEN SATURATION: 98 % | SYSTOLIC BLOOD PRESSURE: 136 MMHG | HEART RATE: 60 BPM | WEIGHT: 150 LBS | BODY MASS INDEX: 26.58 KG/M2 | DIASTOLIC BLOOD PRESSURE: 75 MMHG

## 2025-07-17 PROBLEM — I65.29 CAROTID ATHEROSCLEROSIS, UNSPECIFIED LATERALITY: Status: ACTIVE | Noted: 2025-07-17

## 2025-07-17 PROCEDURE — G2211 COMPLEX E/M VISIT ADD ON: CPT

## 2025-07-17 PROCEDURE — 99214 OFFICE O/P EST MOD 30 MIN: CPT

## 2025-07-17 PROCEDURE — 93000 ELECTROCARDIOGRAM COMPLETE: CPT

## 2025-08-27 ENCOUNTER — APPOINTMENT (OUTPATIENT)
Dept: CARDIOLOGY | Facility: CLINIC | Age: 65
End: 2025-08-27
Payer: COMMERCIAL

## 2025-08-27 PROCEDURE — 93880 EXTRACRANIAL BILAT STUDY: CPT
